# Patient Record
Sex: FEMALE | Race: BLACK OR AFRICAN AMERICAN | NOT HISPANIC OR LATINO | Employment: UNEMPLOYED | URBAN - METROPOLITAN AREA
[De-identification: names, ages, dates, MRNs, and addresses within clinical notes are randomized per-mention and may not be internally consistent; named-entity substitution may affect disease eponyms.]

---

## 2023-02-16 ENCOUNTER — INITIAL PRENATAL (OUTPATIENT)
Dept: OBSTETRICS AND GYNECOLOGY | Facility: CLINIC | Age: 18
End: 2023-02-16
Payer: COMMERCIAL

## 2023-02-16 VITALS — WEIGHT: 101.6 LBS

## 2023-02-16 DIAGNOSIS — O09.32 INITIAL OBSTETRIC VISIT IN SECOND TRIMESTER: Primary | ICD-10-CM

## 2023-02-16 LAB
B-HCG UR QL: POSITIVE
EXPIRATION DATE: ABNORMAL
INTERNAL NEGATIVE CONTROL: ABNORMAL
INTERNAL POSITIVE CONTROL: ABNORMAL
Lab: ABNORMAL

## 2023-02-16 PROCEDURE — 99204 OFFICE O/P NEW MOD 45 MIN: CPT | Performed by: OBSTETRICS & GYNECOLOGY

## 2023-02-16 PROCEDURE — 81025 URINE PREGNANCY TEST: CPT | Performed by: OBSTETRICS & GYNECOLOGY

## 2023-02-16 NOTE — PROGRESS NOTES
CC: Initial obstetric visit    HPI: 18-year-old  2 para 0 presents at approximately 22 weeks by an uncertain last menstrual period.  The patient reports that she had an ultrasound at an  clinic early in pregnancy that gave her due date of .  No other prenatal care to this point.  Overall, she is feeling well.  Had some nausea and vomiting in the first trimester, but none now.  Does feel fetal movement.  Obstetric history is significant for 1 induced  and this pregnancy.    Review of systems    This is negative for fever or chills.  Negative for nausea or vomiting.  Negative for for abdominal or pelvic pain.  All other systems are reviewed and are negative.    Assessment and plan    1.  Intrauterine pregnancy at an uncertain gestational age.  Approximately 20 weeks by physical examination.  Approximately 22 weeks by a first trimester ultrasound performed at an  clinic.  I recommend an ultrasound for screening and to confirm estimated gestational age.  The patient agrees.  2.  Pregnancy related counseling was undertaken and questions were answered.  3.  The patient reports that she already has prenatal vitamins.  4.  Prenatal labs today.  5.  1 hour glucose tolerance test at the next visit.  Counseled and questions answered.

## 2023-02-17 LAB
ABO GROUP BLD: ABNORMAL
BASOPHILS # BLD AUTO: 0 X10E3/UL (ref 0–0.2)
BASOPHILS NFR BLD AUTO: 1 %
BLD GP AB SCN SERPL QL: NEGATIVE
EOSINOPHIL # BLD AUTO: 0.1 X10E3/UL (ref 0–0.4)
EOSINOPHIL NFR BLD AUTO: 1 %
ERYTHROCYTE [DISTWIDTH] IN BLOOD BY AUTOMATED COUNT: 15.8 % (ref 11.7–15.4)
HBV SURFACE AG SERPL QL IA: NEGATIVE
HCT VFR BLD AUTO: 32 % (ref 34–46.6)
HCV IGG SERPL QL IA: NON REACTIVE
HGB BLD-MCNC: 10.5 G/DL (ref 11.1–15.9)
HGB S BLD QL SOLY: NEGATIVE
HIV 1+2 AB+HIV1 P24 AG SERPL QL IA: NON REACTIVE
IMM GRANULOCYTES # BLD AUTO: 0 X10E3/UL (ref 0–0.1)
IMM GRANULOCYTES NFR BLD AUTO: 0 %
LYMPHOCYTES # BLD AUTO: 1.7 X10E3/UL (ref 0.7–3.1)
LYMPHOCYTES NFR BLD AUTO: 23 %
MCH RBC QN AUTO: 27.3 PG (ref 26.6–33)
MCHC RBC AUTO-ENTMCNC: 32.8 G/DL (ref 31.5–35.7)
MCV RBC AUTO: 83 FL (ref 79–97)
MONOCYTES # BLD AUTO: 0.4 X10E3/UL (ref 0.1–0.9)
MONOCYTES NFR BLD AUTO: 6 %
NEUTROPHILS # BLD AUTO: 5 X10E3/UL (ref 1.4–7)
NEUTROPHILS NFR BLD AUTO: 69 %
PLATELET # BLD AUTO: 414 X10E3/UL (ref 150–450)
RBC # BLD AUTO: 3.85 X10E6/UL (ref 3.77–5.28)
RH BLD: POSITIVE
RPR SER QL: NON REACTIVE
RUBV IGG SERPL IA-ACNC: 1.66 INDEX
WBC # BLD AUTO: 7.2 X10E3/UL (ref 3.4–10.8)

## 2023-02-18 LAB
A VAGINAE DNA VAG QL NAA+PROBE: ABNORMAL SCORE
BVAB2 DNA VAG QL NAA+PROBE: ABNORMAL SCORE
C ALBICANS DNA VAG QL NAA+PROBE: NEGATIVE
C GLABRATA DNA VAG QL NAA+PROBE: NEGATIVE
C TRACH DNA VAG QL NAA+PROBE: NEGATIVE
MEGA1 DNA VAG QL NAA+PROBE: ABNORMAL SCORE
N GONORRHOEA DNA VAG QL NAA+PROBE: NEGATIVE
T VAGINALIS DNA VAG QL NAA+PROBE: NEGATIVE

## 2023-02-19 LAB
BACTERIA UR CULT: NORMAL
BACTERIA UR CULT: NORMAL

## 2023-03-17 ENCOUNTER — TELEPHONE (OUTPATIENT)
Dept: OBSTETRICS AND GYNECOLOGY | Facility: CLINIC | Age: 18
End: 2023-03-17

## 2023-03-17 ENCOUNTER — REFERRAL TRIAGE (OUTPATIENT)
Dept: LABOR AND DELIVERY | Facility: HOSPITAL | Age: 18
End: 2023-03-17
Payer: COMMERCIAL

## 2023-03-17 ENCOUNTER — ROUTINE PRENATAL (OUTPATIENT)
Dept: OBSTETRICS AND GYNECOLOGY | Facility: CLINIC | Age: 18
End: 2023-03-17
Payer: COMMERCIAL

## 2023-03-17 VITALS — WEIGHT: 107.6 LBS | SYSTOLIC BLOOD PRESSURE: 112 MMHG | DIASTOLIC BLOOD PRESSURE: 66 MMHG

## 2023-03-17 DIAGNOSIS — O09.32 INITIAL OBSTETRIC VISIT IN SECOND TRIMESTER: ICD-10-CM

## 2023-03-17 DIAGNOSIS — O26.879 SHORT CERVIX AFFECTING PREGNANCY: Primary | ICD-10-CM

## 2023-03-17 DIAGNOSIS — O09.30 LATE PRENATAL CARE: ICD-10-CM

## 2023-03-17 DIAGNOSIS — Z3A.24 24 WEEKS GESTATION OF PREGNANCY: Primary | ICD-10-CM

## 2023-03-17 DIAGNOSIS — O26.879 SHORT CERVIX AFFECTING PREGNANCY: ICD-10-CM

## 2023-03-17 LAB
GLUCOSE UR STRIP-MCNC: NEGATIVE MG/DL
PROT UR STRIP-MCNC: NEGATIVE MG/DL

## 2023-03-17 PROCEDURE — 99214 OFFICE O/P EST MOD 30 MIN: CPT | Performed by: NURSE PRACTITIONER

## 2023-03-17 NOTE — PROGRESS NOTES
Chief Complaint   Patient presents with   • Routine Prenatal Visit     OB f/u + 1 hr GTT + US     OB follow up     Venice Lao is a 18 y.o.  Unknown being seen today for her obstetrical visit.  Patient reports no complaints. Fetal movement: normal. Completing 1 hr GTT today. Anatomy scan today.    Review of Systems  Cramping/contractions: denies  Vaginal bleeding: denies  Fetal movement: normal    /66   Wt 48.8 kg (107 lb 9.6 oz)     Assessment/Plan    Diagnoses and all orders for this visit:    1. 24 weeks gestation of pregnancy (Primary)    2. Late prenatal care    3. Short cervix affecting pregnancy       Normal appearing anatomy, EFW 1lb 8oz, MVP 7.26cm, Cervical length 2.64cm, Possible cervical funneling vs cervical fluid, Fundal placenta with 3 vessel cord  Discussed possible shortened cervix vs fluid in the cervix. Encouraged no IC until further evaluation. Will have pt f/u with MFM as a precaution. Orders entered and I have reached out to MFM office  Reviewed fetal kick counts  Completing one hour GTT today  Tdap at next visit  Reviewed this stage of pregnancy  Problem list updated     Follow up in 2 week(s) with Dr. Pruitt    I spent 32 minutes caring for Venice on this date of service. This time includes time spent by me in the following activities: preparing for the visit, reviewing tests, obtaining and/or reviewing a separately obtained history, performing a medically appropriate examination and/or evaluation, counseling and educating the patient/family/caregiver, ordering medications, tests, or procedures, referring and communicating with other health care professionals and documenting information in the medical record    Adela Malave, APRN  3/17/2023  12:50 EDT

## 2023-03-18 LAB
BLD GP AB SCN SERPL QL: NEGATIVE
GLUCOSE 1H P 50 G GLC PO SERPL-MCNC: 110 MG/DL (ref 65–139)

## 2023-03-20 ENCOUNTER — PATIENT OUTREACH (OUTPATIENT)
Dept: LABOR AND DELIVERY | Facility: HOSPITAL | Age: 18
End: 2023-03-20
Payer: COMMERCIAL

## 2023-03-20 NOTE — OUTREACH NOTE
[facility]  Unable to Reach       Questions/Answers    Flowsheet Row Responses   Pending Outreach Confirm Patient Interest   Call Attempt First   Outcome Left message   Next Call Attempt Date 03/21/23          Called mobile number listed in [email or web address], person who answered gave me [phone]. Left vm to reach out with any needs. Will try again later this week.    Dwain Daugherty RN  Maternity Nurse Navigator    3/20/2023, 13:55 EDT

## 2023-03-21 ENCOUNTER — PATIENT OUTREACH (OUTPATIENT)
Dept: LABOR AND DELIVERY | Facility: HOSPITAL | Age: 18
End: 2023-03-21
Payer: COMMERCIAL

## 2023-03-21 NOTE — OUTREACH NOTE
[facility]  Unable to Reach       Questions/Answers    Flowsheet Row Responses   Pending Outreach Confirm Patient Interest   Call Attempt Second   Outcome Left message          Declined program for pt d/t UTR: 2 voicemails and 1 mychart msg sent. Pt has my contact info and encouraged her to reach out with any needs.      Dwain Daugherty RN  Maternity Nurse Navigator    3/21/2023, 11:13 EDT

## 2023-03-21 NOTE — PROGRESS NOTES
MATERNAL FETAL MEDICINE Consult Note    Dear Dr Adela Malave, APRN:    Thank you for your kind referral of Venice Lao.  As you know, she is a 18 y.o.   at 26  6/7 weeks gestation (Estimated Date of Delivery: 23). This is a consult.     Her antepartum course is complicated by:  Possible short cervix not seen today  FGR mostly normal dopplers    Aneuploidy Screening: none, collected today    HPI: Today, she denies headache, blurry vision, RUQ pain. No vaginal bleeding, no contractions.     Review of History:  History reviewed. No pertinent past medical history.  History reviewed. No pertinent surgical history.    Social History     Socioeconomic History   • Marital status: Single   Tobacco Use   • Smoking status: Never     Passive exposure: Never   • Smokeless tobacco: Never   Vaping Use   • Vaping Use: Never used   Substance and Sexual Activity   • Alcohol use: Never   • Drug use: Never   • Sexual activity: Yes     Partners: Male     Birth control/protection: None     History reviewed. No pertinent family history.   No Known Allergies   Current Outpatient Medications on File Prior to Visit   Medication Sig Dispense Refill   • prenatal vitamin (prenatal, CLASSIC, vitamin) tablet Take  by mouth Daily.       No current facility-administered medications on file prior to visit.        Past obstetric, gynecological, medical, surgical, family and social history reviewed.  Relevant lab work and imaging reviewed.    Review of systems  Constitutional:  denies fever, chills, malaise.   ENT/Mouth:  denies sore throat, tinnitis  Eyes: denies vision changes/pain  CV:  denies chest pain  Respiratory:  denies cough/SOB  GI:  denies N/V, diarrhea, abdominal pain.    :   denies dysuria  Skin:  denies lesions or pruritis   Neuro:  denies weakness, focal neurologic symptoms    Vitals:    23 1209   BP: 122/69   BP Location: Right arm   Patient Position: Sitting   Pulse: 82   Resp: 16   Temp: 98.2 °F (36.8 °C)  "  TempSrc: Temporal   Weight: 49.2 kg (108 lb 6.4 oz)   Height: 157.5 cm (62\")       PHYSICAL EXAM   GENERAL: Not in acute distress, AAOx3, pleasant  CARDIO: regular rate and rhythm  PULM: symmetric chest rise, speaking in complete sentences without difficulty  NEURO: awake, alert and oriented to person, place, and time  ABDOMINAL: No fundal tenderness, no rebound or guarding, gravid  EXTREMITIES: no bilateral lower extremity edema/tenderness  SKIN: Warm, well-perfused      ULTRASOUND   Please view full ultrasound note on Imaging tab in ViewPoint.  Breech presentation.  Fundal/right lateral placenta.  GLORIA 18 cm, which is normal.   g (22%, AC 7%)  BPP 8/8.  UA dopplers mostly normal (/10 elevated).  No absent or reversed flow noted.      ASSESSMENT/COUNSELIN y.o.   at 26  6/7 weeks gestation (Estimated Date of Delivery: 23).    -Pregnancy  [ X ] stable  [   ] improving [  ] worsening    Diagnoses and all orders for this visit:    1. Fetal growth problem suspected but not found (Primary)    2. Maternal care for poor fetal growth in third trimester, single or unspecified fetus  -     US [facility]; Standing         Short cervix: not found.  Thankfully, appears to be cervical mucous in canal and no funneling visualized.  Discussed with pt sometimes it is difficult to tell without multiple imaging.      FGR mostly normal dopplers  We discussed the potential underlying etiologies of severe fetal growth restriction including but not limited to fetal chromosome disorder, syndromes, congenital infection, constitutional, and placental insufficiency.  FGR is assoicated with increased risk of stillbirth, abnormal heart rates patterns,  delivery, oligohydramnios.  She has not had genetic screening but did have it done today.  I did  her that I can never rule out genetic causes by ultrasound or cell free DNA.  Cell free DNA does not check for all genetic issues.  " With congenital infections, we often see profound CNS findings such as ventriculomegaly, echogenic bowel, and even hydrops.  We do not see any of this today.       I think the most likely cause of this is placental insufficiency. Timing of delivery is guided by the results  testing of fetal well being (ultrasound BPP, FHR tracing findings) in addition to Doppler findings (umbilical artery, MCA, and ductus venosus).  Today we have mostly normal umbilical artery dopplers with an 8/8 BPP which is reassuring.  We discussed umbilical artery doppler studies as 4 possible levels: normal, elevated, absent, and reversed.  We discussed that absent and reversed would require admission,  corticosteroids, and possibly delivery depending on gestational age and other testing.         I would recommend limitation of activities, daily fetal movement assessment, and twice weekly ANFS with BPP and Doppler studies.  A worsening of findings on testing would indicate an increased risk of indicated delivery, and inpatient management may be recommended at that time(with steroids).       Finally, we discussed that placental insufficiency causing fetal growth restriction can be a heralding sign of pre-eclampsia.She has no evidence of this today, but should be observed for possible development as pregnancy progresses.  We discussed in detail symptoms of pre-eclampsia.      She will get cell free DNA today     Summary of Plan  -Repeat Growth in 2 weeks  -Monday [facility] with dopplers/ Thursday at primary OB for BPP or NST--will call OB office an dmake sure this works--she needs twice weekly testing.    -Getting panorama today  -Careful attention to fetal movement and symptoms of pre-eclampsia  -Continue to follow with OB for prenatal care     Follow-up:  at [facility]    Thank you for the consult and opportunity to care for this patient.  Please feel free to reach out with any questions or concerns.      I spent 25 minutes caring for this  patient on this date of service. This time includes time spent by me in the following activities: preparing for the visit, reviewing tests, obtaining and/or reviewing a separately obtained history, performing a medically appropriate examination and/or evaluation, counseling and educating the patient/family/caregiver and independently interpreting results and communicating that information with the patient/family/caregiver with greater than 50% spent in counseling and coordination of care.       I spent 5 minutes on the separately reported service of US imaging not included in the time used to support the E/M service also reported today.      Adela Godinez MD FACOG  Maternal Fetal Medicine-[facility]  Office: [phone]  [email or web address]

## 2023-03-22 ENCOUNTER — OFFICE VISIT (OUTPATIENT)
Dept: OBSTETRICS AND GYNECOLOGY | Facility: CLINIC | Age: 18
End: 2023-03-22
Payer: COMMERCIAL

## 2023-03-22 ENCOUNTER — HOSPITAL ENCOUNTER (OUTPATIENT)
Dept: ULTRASOUND IMAGING | Facility: HOSPITAL | Age: 18
Discharge: HOME | End: 2023-03-22
Payer: COMMERCIAL

## 2023-03-22 ENCOUNTER — LAB (OUTPATIENT)
Dept: LAB | Facility: HOSPITAL | Age: 18
End: 2023-03-22
Payer: COMMERCIAL

## 2023-03-22 VITALS
DIASTOLIC BLOOD PRESSURE: 69 MMHG | RESPIRATION RATE: 16 BRPM | WEIGHT: 108.4 LBS | BODY MASS INDEX: 19.95 KG/M2 | HEIGHT: 62 IN | SYSTOLIC BLOOD PRESSURE: 122 MMHG | TEMPERATURE: 98.2 F | HEART RATE: 82 BPM

## 2023-03-22 DIAGNOSIS — Z03.74 FETAL GROWTH PROBLEM SUSPECTED BUT NOT FOUND: Primary | ICD-10-CM

## 2023-03-22 DIAGNOSIS — O36.5930 MATERNAL CARE FOR POOR FETAL GROWTH IN THIRD TRIMESTER, SINGLE OR UNSPECIFIED FETUS: ICD-10-CM

## 2023-03-22 DIAGNOSIS — O26.879 SHORT CERVIX AFFECTING PREGNANCY: ICD-10-CM

## 2023-03-22 PROCEDURE — 76820 UMBILICAL ARTERY ECHO: CPT | Performed by: OBSTETRICS & GYNECOLOGY

## 2023-03-22 PROCEDURE — 76811 OB US DETAILED SNGL FETUS: CPT

## 2023-03-22 PROCEDURE — 76817 TRANSVAGINAL US OBSTETRIC: CPT

## 2023-03-22 PROCEDURE — 76819 FETAL BIOPHYS PROFIL W/O NST: CPT

## 2023-03-22 PROCEDURE — 76817 TRANSVAGINAL US OBSTETRIC: CPT | Performed by: OBSTETRICS & GYNECOLOGY

## 2023-03-22 PROCEDURE — 76820 UMBILICAL ARTERY ECHO: CPT

## 2023-03-22 PROCEDURE — 76811 OB US DETAILED SNGL FETUS: CPT | Performed by: OBSTETRICS & GYNECOLOGY

## 2023-03-22 PROCEDURE — 76819 FETAL BIOPHYS PROFIL W/O NST: CPT | Performed by: OBSTETRICS & GYNECOLOGY

## 2023-03-22 RX ORDER — PRENATAL VIT NO.126/IRON/FOLIC 28MG-0.8MG
TABLET ORAL DAILY
COMMUNITY

## 2023-03-22 NOTE — PROGRESS NOTES
Pt reports that she is doing well and denies vaginal bleeding, cramping, contractions or LOF at this time. Reports active fetal movement. Reviewed when to call OB office or present to L&D for evaluation with symptoms such as decreased fetal movement, vaginal bleeding, LOF or ctxs. Pt verbalized understanding. Denies HA, visual changes or epigastric pain. Denies any additional complaints at time of appointment. Next OB appointment scheduled for 03/30    Vitals:    03/22/23 1209   BP: 122/69   Pulse: 82   Resp: 16   Temp: 98.2 °F (36.8 °C)

## 2023-03-27 ENCOUNTER — OFFICE VISIT (OUTPATIENT)
Dept: OBSTETRICS AND GYNECOLOGY | Facility: CLINIC | Age: 18
End: 2023-03-27
Payer: COMMERCIAL

## 2023-03-27 ENCOUNTER — HOSPITAL ENCOUNTER (OUTPATIENT)
Dept: ULTRASOUND IMAGING | Facility: HOSPITAL | Age: 18
Discharge: HOME | End: 2023-03-27
Admitting: OBSTETRICS & GYNECOLOGY
Payer: COMMERCIAL

## 2023-03-27 VITALS
SYSTOLIC BLOOD PRESSURE: 111 MMHG | BODY MASS INDEX: 20.35 KG/M2 | RESPIRATION RATE: 16 BRPM | HEIGHT: 62 IN | TEMPERATURE: 98 F | HEART RATE: 94 BPM | DIASTOLIC BLOOD PRESSURE: 73 MMHG | WEIGHT: 110.6 LBS

## 2023-03-27 DIAGNOSIS — O36.5930 MATERNAL CARE FOR POOR FETAL GROWTH IN THIRD TRIMESTER, SINGLE OR UNSPECIFIED FETUS: Primary | ICD-10-CM

## 2023-03-27 PROCEDURE — 76819 FETAL BIOPHYS PROFIL W/O NST: CPT

## 2023-03-27 PROCEDURE — 76820 UMBILICAL ARTERY ECHO: CPT

## 2023-03-27 NOTE — PROGRESS NOTES
Pt reports that she is doing well and denies vaginal bleeding, cramping, contractions or LOF at this time. Reports active fetal movement. Reviewed when to call OB office or present to L&D for evaluation with symptoms such as decreased fetal movement, vaginal bleeding, LOF or ctxs. Pt verbalized understanding. Denies HA, visual changes or epigastric pain. Denies any additional complaints at time of appointment. Next OB appointment scheduled for 03/30    Vitals:    03/27/23 1212   BP: 111/73   Pulse: 94   Resp: 16   Temp: 98 °F (36.7 °C)

## 2023-03-27 NOTE — PROGRESS NOTES
MATERNAL FETAL MEDICINE Consult Note    Dear Dr Steven Pruitt MD:    Thank you for your kind referral of Venice Lao.  As you know, she is a 18 y.o.   at 26  6/7 weeks gestation (Estimated Date of Delivery: 23). This is a consult.     Her antepartum course is complicated by:  FGR mostly normal dopplers    Aneuploidy Screening: none, collected today    HPI: Today, she denies headache, blurry vision, RUQ pain. No vaginal bleeding, no contractions.     Review of History:  History reviewed. No pertinent past medical history.  History reviewed. No pertinent surgical history.    Social History     Socioeconomic History   • Marital status: Single   Tobacco Use   • Smoking status: Never     Passive exposure: Never   • Smokeless tobacco: Never   Vaping Use   • Vaping Use: Never used   Substance and Sexual Activity   • Alcohol use: Never   • Drug use: Never   • Sexual activity: Yes     Partners: Male     Birth control/protection: None     History reviewed. No pertinent family history.   No Known Allergies   Current Outpatient Medications on File Prior to Visit   Medication Sig Dispense Refill   • prenatal vitamin (prenatal, CLASSIC, vitamin) tablet Take  by mouth Daily.       No current facility-administered medications on file prior to visit.        Past obstetric, gynecological, medical, surgical, family and social history reviewed.  Relevant lab work and imaging reviewed.    Review of systems  Constitutional:  denies fever, chills, malaise.   ENT/Mouth:  denies sore throat, tinnitis  Eyes: denies vision changes/pain  CV:  denies chest pain  Respiratory:  denies cough/SOB  GI:  denies N/V, diarrhea, abdominal pain.    :   denies dysuria  Skin:  denies lesions or pruritis   Neuro:  denies weakness, focal neurologic symptoms    Vitals:    23 1212   BP: 111/73   BP Location: Right arm   Patient Position: Sitting   Pulse: 94   Resp: 16   Temp: 98 °F (36.7 °C)   TempSrc: Temporal   Weight: 50.2 kg  "(110 lb 9.6 oz)   Height: 157.5 cm (62\")       PHYSICAL EXAM   GENERAL: Not in acute distress, AAOx3, pleasant  CARDIO: regular rate and rhythm  PULM: symmetric chest rise, speaking in complete sentences without difficulty  NEURO: awake, alert and oriented to person, place, and time  ABDOMINAL: No fundal tenderness, no rebound or guarding, gravid  EXTREMITIES: no bilateral lower extremity edema/tenderness  SKIN: Warm, well-perfused      ULTRASOUND   Please view full ultrasound note on Imaging tab in ViewPoint.  Cephalic presentation.  Fundal/ right lateral placenta.  GLORIA 17.5 cm, which is normal.  BPP 8/8.  UA dopplers normal, with 1 isolated elevated doppler (1/8 elevated).  No waveforms with absent or reversed.      ASSESSMENT/COUNSELIN y.o.   at 26  6/7 weeks gestation (Estimated Date of Delivery: 23).    -Pregnancy  [ X ] stable  [   ] improving [  ] worsening    Diagnoses and all orders for this visit:    1. Maternal care for poor fetal growth in third trimester, single or unspecified fetus (Primary)         Short cervix: not found.  Thankfully, appears to be cervical mucous in canal and no funneling visualized.  Discussed with pt sometimes it is difficult to tell without multiple imaging.      FGR mostly normal dopplers  We reviewed the potential underlying etiologies of severe fetal growth restriction including but not limited to fetal chromosome disorder, syndromes, congenital infection, constitutional, and placental insufficiency. Cell free DNA pending and should be back in next 1-2 days and we will call her.       I think the most likely cause of this is placental insufficiency. Timing of delivery is guided by the results  testing of fetal well being (ultrasound BPP, FHR tracing findings) in addition to Doppler findings (umbilical artery, MCA, and ductus venosus).  Today we have mostly normal umbilical artery dopplers with an 8/8 BPP which is reassuring.  We discussed umbilical artery " doppler studies as 4 possible levels: normal, elevated, absent, and reversed.  We discussed that absent and reversed would require admission,  corticosteroids, and possibly delivery depending on gestational age and other testing.         I would recommend limitation of activities, daily fetal movement assessment, and twice weekly ANFS with BPP and Doppler studies.  A worsening of findings on testing would indicate an increased risk of indicated delivery, and inpatient management may be recommended at that time(with steroids).       Summary of Plan  -Repeat Growth in 1 weeks  - with dopplers/ Thursday at primary OB for BPP or NST  -Panorama pending and should be back soon  -Careful attention to fetal movement and symptoms of pre-eclampsia  -Continue to follow with OB for prenatal care     Follow-up:  at [facility]    Thank you for the consult and opportunity to care for this patient.  Please feel free to reach out with any questions or concerns.      I spent 15 minutes caring for this patient on this date of service. This time includes time spent by me in the following activities: preparing for the visit, reviewing tests, obtaining and/or reviewing a separately obtained history, performing a medically appropriate examination and/or evaluation, counseling and educating the patient/family/caregiver and independently interpreting results and communicating that information with the patient/family/caregiver with greater than 50% spent in counseling and coordination of care.       I spent 5 minutes on the separately reported service of US imaging not included in the time used to support the E/M service also reported today.      Adela Godinez MD FACOG  Maternal Fetal Medicine-[facility]  Office: [phone]  [email or web address]

## 2023-03-30 ENCOUNTER — ROUTINE PRENATAL (OUTPATIENT)
Dept: OBSTETRICS AND GYNECOLOGY | Facility: CLINIC | Age: 18
End: 2023-03-30
Payer: COMMERCIAL

## 2023-03-30 VITALS — BODY MASS INDEX: 20.38 KG/M2 | WEIGHT: 111.4 LBS | DIASTOLIC BLOOD PRESSURE: 62 MMHG | SYSTOLIC BLOOD PRESSURE: 110 MMHG

## 2023-03-30 DIAGNOSIS — O36.5939 SGA (SMALL FOR GESTATIONAL AGE), FETAL, AFFECTING CARE OF MOTHER, ANTEPARTUM, THIRD TRIMESTER, OTHER FETUS: ICD-10-CM

## 2023-03-30 DIAGNOSIS — Z3A.28 28 WEEKS GESTATION OF PREGNANCY: Primary | ICD-10-CM

## 2023-03-30 NOTE — PROGRESS NOTES
CC: Obstetric visit    HPI: 18-year-old  2 para 0 presents at 28-0/7 weeks for her obstetric visit.  Her baby is moving actively.  She has no complaints today.  She has been followed for SGA versus IUGR.  Biophysical profile with Dopplers was performed today.    Review of systems    This is negative for fever or chills.  Negative for nausea or vomiting.  Negative for abdominal or pelvic pain.    Physical examination    The abdomen is soft and gravid.  There is no epigastric tenderness.  No fundal tenderness.  No suprapubic tenderness.  Extremities are equal in size    Assessment and plan    1.  Intrauterine pregnancy at 28-0/7 weeks  2.  SGA versus IUGR.  Counseled and questions answered.  Biophysical profile today is 8 out of 8 with a normal amniotic fluid index and normal Dopplers.  The patient will continue having biophysical profiles in this office once weekly.  She will also see Dr. Godinez on .  Scan for growth is scheduled for Monday.  The patient understands that the baby may be constitutionally small versus growth restricted and that close follow-up is indicated.   You can access the FollowMyHealth Patient Portal offered by [facility] by registering at the following website: [email or web address]. By joining [employer]’s FollowMyHealth portal, you will also be able to view your health information using other applications (apps) compatible with our system.

## 2023-04-03 ENCOUNTER — HOSPITAL ENCOUNTER (OUTPATIENT)
Dept: ULTRASOUND IMAGING | Facility: HOSPITAL | Age: 18
Discharge: HOME | End: 2023-04-03
Admitting: OBSTETRICS & GYNECOLOGY
Payer: COMMERCIAL

## 2023-04-03 VITALS
HEIGHT: 62 IN | RESPIRATION RATE: 16 BRPM | WEIGHT: 111.8 LBS | TEMPERATURE: 97.8 F | HEART RATE: 71 BPM | BODY MASS INDEX: 20.57 KG/M2 | SYSTOLIC BLOOD PRESSURE: 118 MMHG | DIASTOLIC BLOOD PRESSURE: 69 MMHG

## 2023-04-03 PROCEDURE — 76820 UMBILICAL ARTERY ECHO: CPT

## 2023-04-03 PROCEDURE — 76819 FETAL BIOPHYS PROFIL W/O NST: CPT

## 2023-04-03 PROCEDURE — 76816 OB US FOLLOW-UP PER FETUS: CPT

## 2023-04-06 ENCOUNTER — ROUTINE PRENATAL (OUTPATIENT)
Dept: OBSTETRICS AND GYNECOLOGY | Facility: CLINIC | Age: 18
End: 2023-04-06
Payer: COMMERCIAL

## 2023-04-06 VITALS — SYSTOLIC BLOOD PRESSURE: 100 MMHG | BODY MASS INDEX: 19.97 KG/M2 | DIASTOLIC BLOOD PRESSURE: 62 MMHG | WEIGHT: 109.2 LBS

## 2023-04-06 DIAGNOSIS — O36.5939 SGA (SMALL FOR GESTATIONAL AGE), FETAL, AFFECTING CARE OF MOTHER, ANTEPARTUM, THIRD TRIMESTER, OTHER FETUS: ICD-10-CM

## 2023-04-06 DIAGNOSIS — Z3A.29 29 WEEKS GESTATION OF PREGNANCY: Primary | ICD-10-CM

## 2023-04-06 NOTE — PROGRESS NOTES
CC: Obstetric visit    HPI: 18-year-old  2 para 0 presents at 29-0/7 weeks for her obstetric visit.  Her baby is moving actively.  She has no complaints today.    Review of systems    This is negative for abdominal or pelvic pain.  Negative for vaginal bleeding.    Physical examination    The abdomen is soft and gravid.  There is no epigastric tenderness.  No fundal tenderness.  No suprapubic tenderness.  Extremities are equal in size    Assessment and plan    1.  Intrauterine pregnancy at 29-0/7 weeks  2.  The patient is followed for SGA versus IUGR.  Biophysical profile was performed today.  This was reviewed and discussed with the patient.  BPP is 8 out of 8 with a normal amniotic fluid index.  The patient has a scan for interval growth with maternal-fetal medicine on Monday.  Further recommendations pending the results of this study.

## 2023-04-09 NOTE — PROGRESS NOTES
MATERNAL FETAL MEDICINE Consult Note    Dear Dr Steven Pruitt MD:    Thank you for your kind referral of Venice Lao.  As you know, she is a 18 y.o.   at 29  4/7 weeks gestation (Estimated Date of Delivery: 23). This is a consult.     Her antepartum course is complicated by:  FGR normal dopplers    Aneuploidy Screening: low risk male    HPI: Today, she denies headache, blurry vision, RUQ pain. No vaginal bleeding, no contractions.     Review of History:  History reviewed. No pertinent past medical history.  History reviewed. No pertinent surgical history.    Social History     Socioeconomic History   • Marital status: Single   Tobacco Use   • Smoking status: Never     Passive exposure: Never   • Smokeless tobacco: Never   Vaping Use   • Vaping Use: Never used   Substance and Sexual Activity   • Alcohol use: Never   • Drug use: Never   • Sexual activity: Yes     Partners: Male     Birth control/protection: None     History reviewed. No pertinent family history.   No Known Allergies   Current Outpatient Medications on File Prior to Visit   Medication Sig Dispense Refill   • prenatal vitamin (prenatal, CLASSIC, vitamin) tablet Take  by mouth Daily.       No current facility-administered medications on file prior to visit.        Past obstetric, gynecological, medical, surgical, family and social history reviewed.  Relevant lab work and imaging reviewed.    Review of systems  Constitutional:  denies fever, chills, malaise.   ENT/Mouth:  denies sore throat, tinnitis  Eyes: denies vision changes/pain  CV:  denies chest pain  Respiratory:  denies cough/SOB  GI:  denies N/V, diarrhea, abdominal pain.    :   denies dysuria  Skin:  denies lesions or pruritis   Neuro:  denies weakness, focal neurologic symptoms    Vitals:    04/10/23 1211   BP: 119/64   BP Location: Right arm   Patient Position: Sitting   Pulse: 88   Resp: 16   Temp: 97.8 °F (36.6 °C)   TempSrc: Temporal   Weight: 51.8 kg (114 lb 3.2 oz)  "  Height: 157.5 cm (62\")       PHYSICAL EXAM   GENERAL: Not in acute distress, AAOx3, pleasant  CARDIO: regular rate and rhythm  PULM: symmetric chest rise, speaking in complete sentences without difficulty  NEURO: awake, alert and oriented to person, place, and time  ABDOMINAL: No fundal tenderness, no rebound or guarding, gravid  EXTREMITIES: no bilateral lower extremity edema/tenderness  SKIN: Warm, well-perfused      ULTRASOUND   Please view full ultrasound note on Imaging tab in ViewPoint.  Cephalic presentation.  Fundal right placenta.  GLORIA 19 cm, which is normal.  BPP 8/8.  Umbilical artery doppler studies normal without absent or reversed flow.     ASSESSMENT/COUNSELIN y.o.   at 29  4/7 weeks gestation (Estimated Date of Delivery: 23).    -Pregnancy  [ X ] stable  [   ] improving [  ] worsening    Diagnoses and all orders for this visit:    1. Maternal care for poor fetal growth in third trimester, single or unspecified fetus (Primary)         Short cervix: not found.  Thankfully, appears to be cervical mucous in canal and no funneling visualized.  Discussed with pt sometimes it is difficult to tell without multiple imaging.      FGR  normal dopplers  Normal cell free DNA     I think the most likely cause of this is placental insufficiency. Timing of delivery is guided by the results  testing of fetal well being (ultrasound BPP, FHR tracing findings) in addition to Doppler findings (umbilical artery, MCA, and ductus venosus).  Today we have mostly normal umbilical artery dopplers with an 8/8 BPP which is reassuring.  We discussed umbilical artery doppler studies as 4 possible levels: normal, elevated, absent, and reversed.  We discussed that absent and reversed would require admission,  corticosteroids, and possibly delivery depending on gestational age and other testing.         I would recommend limitation of activities, daily fetal movement assessment, and twice weekly ANFS " with BPP and Doppler studies.  A worsening of findings on testing would indicate an increased risk of indicated delivery, and inpatient management may be recommended at that time(with steroids).       Summary of Plan  -Monday [facility] with dopplers/ Thursday at primary OB for BPP or NST  -Careful attention to fetal movement and symptoms of pre-eclampsia  -Continue to follow with OB for prenatal care     Follow-up: Mondays at [facility] for BPP/dopplers    Thank you for the consult and opportunity to care for this patient.  Please feel free to reach out with any questions or concerns.      I spent 20 minutes caring for this patient on this date of service. This time includes time spent by me in the following activities: preparing for the visit, reviewing tests, obtaining and/or reviewing a separately obtained history, performing a medically appropriate examination and/or evaluation, counseling and educating the patient/family/caregiver and independently interpreting results and communicating that information with the patient/family/caregiver with greater than 50% spent in counseling and coordination of care.       I spent 4 minutes on the separately reported service of US imaging not included in the time used to support the E/M service also reported today.      Adela Godinez MD FACOG  Maternal Fetal Medicine-[facility]  Office: [phone]  [email or web address]

## 2023-04-10 ENCOUNTER — OFFICE VISIT (OUTPATIENT)
Dept: OBSTETRICS AND GYNECOLOGY | Facility: CLINIC | Age: 18
End: 2023-04-10
Payer: COMMERCIAL

## 2023-04-10 ENCOUNTER — HOSPITAL ENCOUNTER (OUTPATIENT)
Dept: ULTRASOUND IMAGING | Facility: HOSPITAL | Age: 18
Discharge: HOME | End: 2023-04-10
Admitting: OBSTETRICS & GYNECOLOGY
Payer: COMMERCIAL

## 2023-04-10 VITALS
HEART RATE: 88 BPM | DIASTOLIC BLOOD PRESSURE: 64 MMHG | RESPIRATION RATE: 16 BRPM | HEIGHT: 62 IN | TEMPERATURE: 97.8 F | SYSTOLIC BLOOD PRESSURE: 119 MMHG | BODY MASS INDEX: 21.02 KG/M2 | WEIGHT: 114.2 LBS

## 2023-04-10 DIAGNOSIS — O36.5930 MATERNAL CARE FOR POOR FETAL GROWTH IN THIRD TRIMESTER, SINGLE OR UNSPECIFIED FETUS: Primary | ICD-10-CM

## 2023-04-10 PROCEDURE — 76819 FETAL BIOPHYS PROFIL W/O NST: CPT

## 2023-04-10 PROCEDURE — 76820 UMBILICAL ARTERY ECHO: CPT

## 2023-04-10 NOTE — PROGRESS NOTES
Pt reports that she is doing well and denies vaginal bleeding, cramping, contractions or LOF at this time. Reports active fetal movement Denies HA, visual changes or epigastric pain. Denies any additional complaints at time of appointment. Next OB appointment scheduled for 04/13    Vitals:    04/10/23 1211   BP: 119/64   Pulse: 88   Resp: 16   Temp: 97.8 °F (36.6 °C)

## 2023-04-10 NOTE — LETTER
April 10, 2023     Steven Pruitt MD  [address] [place] [address]  [address]  [facility] [address]    Patient: Venice Lao   YOB: 2005   Date of Visit: 4/10/2023       Dear Steven Pruitt MD,    Thank you for referring Venice Lao to me for evaluation. Below is a copy of my consult note.    If you have questions, please do not hesitate to call me. I look forward to following Venice along with you.         Sincerely,        Adela Godinez MD        MATERNAL FETAL MEDICINE Consult Note    Dear Dr Steven Pruitt MD:    Thank you for your kind referral of Venice Lao.  As you know, she is a 18 y.o.   at 29  4/7 weeks gestation (Estimated Date of Delivery: 23). This is a consult.     Her antepartum course is complicated by:  FGR normal dopplers    Aneuploidy Screening: low risk male    HPI: Today, she denies headache, blurry vision, RUQ pain. No vaginal bleeding, no contractions.     Review of History:  History reviewed. No pertinent past medical history.  History reviewed. No pertinent surgical history.    Social History     Socioeconomic History   • Marital status: Single   Tobacco Use   • Smoking status: Never     Passive exposure: Never   • Smokeless tobacco: Never   Vaping Use   • Vaping Use: Never used   Substance and Sexual Activity   • Alcohol use: Never   • Drug use: Never   • Sexual activity: Yes     Partners: Male     Birth control/protection: None     History reviewed. No pertinent family history.   No Known Allergies   Current Outpatient Medications on File Prior to Visit   Medication Sig Dispense Refill   • prenatal vitamin (prenatal, CLASSIC, vitamin) tablet Take  by mouth Daily.       No current facility-administered medications on file prior to visit.        Past obstetric, gynecological, medical, surgical, family and social history reviewed.  Relevant lab work and imaging reviewed.    Review of systems  Constitutional:  denies fever, chills, malaise.  "  ENT/Mouth:  denies sore throat, tinnitis  Eyes: denies vision changes/pain  CV:  denies chest pain  Respiratory:  denies cough/SOB  GI:  denies N/V, diarrhea, abdominal pain.    :   denies dysuria  Skin:  denies lesions or pruritis   Neuro:  denies weakness, focal neurologic symptoms    Vitals:    04/10/23 1211   BP: 119/64   BP Location: Right arm   Patient Position: Sitting   Pulse: 88   Resp: 16   Temp: 97.8 °F (36.6 °C)   TempSrc: Temporal   Weight: 51.8 kg (114 lb 3.2 oz)   Height: 157.5 cm (62\")       PHYSICAL EXAM   GENERAL: Not in acute distress, AAOx3, pleasant  CARDIO: regular rate and rhythm  PULM: symmetric chest rise, speaking in complete sentences without difficulty  NEURO: awake, alert and oriented to person, place, and time  ABDOMINAL: No fundal tenderness, no rebound or guarding, gravid  EXTREMITIES: no bilateral lower extremity edema/tenderness  SKIN: Warm, well-perfused      ULTRASOUND   Please view full ultrasound note on Imaging tab in [facility].  Cephalic presentation.  Fundal right placenta.  GLORIA 19 cm, which is normal.  BPP 8/8.  Umbilical artery doppler studies normal without absent or reversed flow.     ASSESSMENT/COUNSELIN y.o.   at 29  4/7 weeks gestation (Estimated Date of Delivery: 23).    -Pregnancy  [ X ] stable  [   ] improving [  ] worsening    Diagnoses and all orders for this visit:    1. Maternal care for poor fetal growth in third trimester, single or unspecified fetus (Primary)         Short cervix: not found.  Thankfully, appears to be cervical mucous in canal and no funneling visualized.  Discussed with pt sometimes it is difficult to tell without multiple imaging.      FGR  normal dopplers  Normal cell free DNA     I think the most likely cause of this is placental insufficiency. Timing of delivery is guided by the results  testing of fetal well being (ultrasound BPP, FHR tracing findings) in addition to Doppler findings (umbilical artery, MCA, and " ductus venosus).  Today we have mostly normal umbilical artery dopplers with an 8/8 BPP which is reassuring.  We discussed umbilical artery doppler studies as 4 possible levels: normal, elevated, absent, and reversed.  We discussed that absent and reversed would require admission,  corticosteroids, and possibly delivery depending on gestational age and other testing.         I would recommend limitation of activities, daily fetal movement assessment, and twice weekly ANFS with BPP and Doppler studies.  A worsening of findings on testing would indicate an increased risk of indicated delivery, and inpatient management may be recommended at that time(with steroids).       Summary of Plan  -Monday [facility] with dopplers/ Thursday at primary OB for BPP or NST  -Careful attention to fetal movement and symptoms of pre-eclampsia  -Continue to follow with OB for prenatal care     Follow-up:  at [facility] for BPP/dopplers    Thank you for the consult and opportunity to care for this patient.  Please feel free to reach out with any questions or concerns.      I spent 20 minutes caring for this patient on this date of service. This time includes time spent by me in the following activities: preparing for the visit, reviewing tests, obtaining and/or reviewing a separately obtained history, performing a medically appropriate examination and/or evaluation, counseling and educating the patient/family/caregiver and independently interpreting results and communicating that information with the patient/family/caregiver with greater than 50% spent in counseling and coordination of care.       I spent 4 minutes on the separately reported service of US imaging not included in the time used to support the E/M service also reported today.      Adela Godinez MD [facility]  Maternal Fetal Medicine-[facility]  Office: [phone]  [email or web address]         20

## 2023-04-13 ENCOUNTER — ROUTINE PRENATAL (OUTPATIENT)
Dept: OBSTETRICS AND GYNECOLOGY | Facility: CLINIC | Age: 18
End: 2023-04-13
Payer: COMMERCIAL

## 2023-04-13 VITALS — WEIGHT: 115.2 LBS | SYSTOLIC BLOOD PRESSURE: 114 MMHG | BODY MASS INDEX: 21.07 KG/M2 | DIASTOLIC BLOOD PRESSURE: 62 MMHG

## 2023-04-13 DIAGNOSIS — Z3A.30 30 WEEKS GESTATION OF PREGNANCY: Primary | ICD-10-CM

## 2023-04-13 DIAGNOSIS — O36.5931 IUGR (INTRAUTERINE GROWTH RESTRICTION) AFFECTING CARE OF MOTHER, THIRD TRIMESTER, FETUS 1: ICD-10-CM

## 2023-04-13 LAB
GLUCOSE UR STRIP-MCNC: NEGATIVE MG/DL
PROT UR STRIP-MCNC: NEGATIVE MG/DL

## 2023-04-13 NOTE — PROGRESS NOTES
C: Obstetric visit    HPI: 18-year-old  2 para 0 presents at 30-0/7 weeks for her obstetric visit.  She has been followed in this office as well as by maternal-fetal medicine due to intrauterine growth restriction.  Biophysical profile at maternal-fetal medicine and again today returned 8 out of 8 with a normal amniotic fluid index.  The patient is feeling well.  She does not feel contractions or pelvic pressure.    Review of systems    This is negative for fever or chills.  Negative for nausea or vomiting.  Negative for pelvic pressure or pain.    Physical examination    The abdomen is soft and gravid.  There is no epigastric tenderness.  No fundal tenderness.  No suprapubic tenderness.  Extremities are equal in size  Assessment and plan    1.  Intrauterine pregnancy at 30-0/7 weeks  2.  Intrauterine growth restriction.  Biophysical profile here and with maternal-fetal medicine has been 8 out of 8 with normal amniotic fluid indices.  Continue twice-weekly testing.

## 2023-04-16 NOTE — PROGRESS NOTES
MATERNAL FETAL MEDICINE Consult Note    Dear Dr Afshin Mart MD:    Thank you for your kind referral of Venice Lao.  As you know, she is a 18 y.o.   at 30  4/7 weeks gestation (Estimated Date of Delivery: 23). This is a consult.     Her antepartum course is complicated by:  FGR, now intermittently elevated dopplers    Aneuploidy Screening: low risk male    HPI: Today, she denies headache, blurry vision, RUQ pain. No vaginal bleeding, no contractions.     Review of History:  History reviewed. No pertinent past medical history.  History reviewed. No pertinent surgical history.    Social History     Socioeconomic History   • Marital status: Single   Tobacco Use   • Smoking status: Never     Passive exposure: Never   • Smokeless tobacco: Never   Vaping Use   • Vaping Use: Never used   Substance and Sexual Activity   • Alcohol use: Never   • Drug use: Never   • Sexual activity: Yes     Partners: Male     Birth control/protection: None     History reviewed. No pertinent family history.   No Known Allergies   Current Outpatient Medications on File Prior to Visit   Medication Sig Dispense Refill   • prenatal vitamin (prenatal, CLASSIC, vitamin) tablet Take  by mouth Daily.       No current facility-administered medications on file prior to visit.        Past obstetric, gynecological, medical, surgical, family and social history reviewed.  Relevant lab work and imaging reviewed.    Review of systems  Constitutional:  denies fever, chills, malaise.   ENT/Mouth:  denies sore throat, tinnitis  Eyes: denies vision changes/pain  CV:  denies chest pain  Respiratory:  denies cough/SOB  GI:  denies N/V, diarrhea, abdominal pain.    :   denies dysuria  Skin:  denies lesions or pruritis   Neuro:  denies weakness, focal neurologic symptoms    Vitals:    23 1340   BP: 112/64   BP Location: Right arm   Patient Position: Sitting   Pulse: 66   Resp: 16   Temp: 98.4 °F (36.9 °C)   TempSrc: Temporal  "  Weight: 52.5 kg (115 lb 11.9 oz)   Height: 157.5 cm (62\")       PHYSICAL EXAM   GENERAL: Not in acute distress, AAOx3, pleasant  CARDIO: regular rate and rhythm  PULM: symmetric chest rise, speaking in complete sentences without difficulty  NEURO: awake, alert and oriented to person, place, and time  ABDOMINAL: No fundal tenderness, no rebound or guarding, gravid  EXTREMITIES: no bilateral lower extremity edema/tenderness  SKIN: Warm, well-perfused      ULTRASOUND   Please view full ultrasound note on Imaging tab in [facility]Point.  Cephalic presentation.  Fundal placenta.   GLORIA 24 cm, which is high normal.   BPP 8/8  UA dopplers intermittently elevated ( elevated) without absent or reversed flow.    ASSESSMENT/COUNSELIN y.o.   at 30  4/7 weeks gestation (Estimated Date of Delivery: 23).    -Pregnancy  [ X ] stable  [   ] improving [  ] worsening    Diagnoses and all orders for this visit:    1. IUGR (intrauterine growth restriction) affecting care of mother, third trimester, fetus 1 (Primary)         FGR, now elevated dopplers  Normal cell free DNA     I think the most likely cause of this is placental insufficiency. Timing of delivery is guided by the results  testing of fetal well being (ultrasound BPP, FHR tracing findings) in addition to Doppler findings (umbilical artery, MCA, and ductus venosus).  Today we have elevated dopplers intermittently, but a BPP of 8/8.  We discussed that absent and reversed would require admission,  corticosteroids, and possibly delivery depending on gestational age and other testing.         I would recommend limitation of activities, daily fetal movement assessment, and twice weekly ANFS with BPP and Doppler studies.  A worsening of findings on testing would indicate an increased risk of indicated delivery, and inpatient management may be recommended at that time(with steroids).       Summary of Plan  -Monday [facility] with dopplers/ Thursday at primary OB " for BPP or NST  -Careful attention to fetal movement and symptoms of pre-eclampsia  -Continue to follow with OB for prenatal care     Follow-up: Mondays at [facility] for BPP/dopplers    Thank you for the consult and opportunity to care for this patient.  Please feel free to reach out with any questions or concerns.      I spent 15 minutes caring for this patient on this date of service. This time includes time spent by me in the following activities: preparing for the visit, reviewing tests, obtaining and/or reviewing a separately obtained history, performing a medically appropriate examination and/or evaluation, counseling and educating the patient/family/caregiver and independently interpreting results and communicating that information with the patient/family/caregiver with greater than 50% spent in counseling and coordination of care.       I spent 4 minutes on the separately reported service of US imaging not included in the time used to support the E/M service also reported today.      Adela Godinez MD FACOG  Maternal Fetal Medicine-[facility]  Office: [phone]  [email or web address]

## 2023-04-17 ENCOUNTER — OFFICE VISIT (OUTPATIENT)
Dept: OBSTETRICS AND GYNECOLOGY | Facility: CLINIC | Age: 18
End: 2023-04-17
Payer: COMMERCIAL

## 2023-04-17 ENCOUNTER — HOSPITAL ENCOUNTER (OUTPATIENT)
Dept: ULTRASOUND IMAGING | Facility: HOSPITAL | Age: 18
Discharge: HOME | End: 2023-04-17
Admitting: OBSTETRICS & GYNECOLOGY
Payer: COMMERCIAL

## 2023-04-17 VITALS
RESPIRATION RATE: 16 BRPM | HEART RATE: 66 BPM | DIASTOLIC BLOOD PRESSURE: 64 MMHG | BODY MASS INDEX: 21.3 KG/M2 | WEIGHT: 115.74 LBS | HEIGHT: 62 IN | SYSTOLIC BLOOD PRESSURE: 112 MMHG | TEMPERATURE: 98.4 F

## 2023-04-17 DIAGNOSIS — O36.5931 IUGR (INTRAUTERINE GROWTH RESTRICTION) AFFECTING CARE OF MOTHER, THIRD TRIMESTER, FETUS 1: Primary | ICD-10-CM

## 2023-04-17 PROCEDURE — 76819 FETAL BIOPHYS PROFIL W/O NST: CPT

## 2023-04-17 PROCEDURE — 76820 UMBILICAL ARTERY ECHO: CPT

## 2023-04-17 NOTE — PROGRESS NOTES
Pt reports that she is doing well and denies vaginal bleeding, cramping, contractions or LOF at this time. Reports active fetal movement. Reviewed when to call OB office or present to L&D for evaluation with symptoms such as decreased fetal movement, vaginal bleeding, LOF or ctxs. Pt verbalized understanding. Denies HA, visual changes or epigastric pain. Denies any additional complaints at time of appointment. Next OB appointment scheduled for 04/21    Vitals:    04/17/23 1340   BP: 112/64   Pulse: 66   Resp: 16   Temp: 98.4 °F (36.9 °C)

## 2023-04-21 ENCOUNTER — ROUTINE PRENATAL (OUTPATIENT)
Dept: OBSTETRICS AND GYNECOLOGY | Facility: CLINIC | Age: 18
End: 2023-04-21
Payer: COMMERCIAL

## 2023-04-21 VITALS — BODY MASS INDEX: 21.22 KG/M2 | DIASTOLIC BLOOD PRESSURE: 62 MMHG | WEIGHT: 116 LBS | SYSTOLIC BLOOD PRESSURE: 117 MMHG

## 2023-04-21 DIAGNOSIS — O36.5931 IUGR (INTRAUTERINE GROWTH RESTRICTION) AFFECTING CARE OF MOTHER, THIRD TRIMESTER, FETUS 1: ICD-10-CM

## 2023-04-21 DIAGNOSIS — Z3A.31 31 WEEKS GESTATION OF PREGNANCY: Primary | ICD-10-CM

## 2023-04-21 NOTE — PROGRESS NOTES
CC: Obstetric visit    HPI: 18-year-old  2 para 0 presents at 31-1/7 weeks for her obstetric visit.  The patient has been followed in our office and by maternal-fetal medicine for intrauterine growth restriction.  The baby is moving actively.  [facility] visit for Monday was reviewed and discussed with the patient.  Findings were reassuring.  Biophysical profile was also performed today.    Review of systems    This is negative for fever or chills.  Negative for nausea or vomiting.  Negative for abdominal or pelvic pain.    Physical examination    The abdomen is soft and gravid.  There is no epigastric tenderness.  No fundal tenderness.  No suprapubic tenderness.  Extremities are equal in size    Assessment and plan    1.  Intrauterine pregnancy at 31-1/7 weeks  2.  Intrauterine growth restriction.  Counseled and questions answered.  Biophysical profile was reviewed and discussed with the patient.  This shows an 8 out of 8 BPP with normal amniotic fluid index and no reversed or absent end-diastolic flow.  We will continue twice weekly visits, once with maternal-fetal medicine and once with me.  The patient agrees with these recommendations.

## 2023-04-23 NOTE — PROGRESS NOTES
MATERNAL FETAL MEDICINE Consult Note    Dear Dr Steven Pruitt MD:    Thank you for your kind referral of Venice Lao.  As you know, she is a 18 y.o.   at 31  4/7 weeks gestation (Estimated Date of Delivery: 23). This is a consult.     Her antepartum course is complicated by:  FGR, now intermittently elevated dopplers    Aneuploidy Screening: low risk male    HPI: Today, she denies headache, blurry vision, RUQ pain. No vaginal bleeding, no contractions.     Review of History:  History reviewed. No pertinent past medical history.  History reviewed. No pertinent surgical history.    Social History     Socioeconomic History   • Marital status: Single   Tobacco Use   • Smoking status: Never     Passive exposure: Never   • Smokeless tobacco: Never   Vaping Use   • Vaping Use: Never used   Substance and Sexual Activity   • Alcohol use: Never   • Drug use: Never   • Sexual activity: Yes     Partners: Male     Birth control/protection: None     History reviewed. No pertinent family history.   No Known Allergies   Current Outpatient Medications on File Prior to Visit   Medication Sig Dispense Refill   • prenatal vitamin (prenatal, CLASSIC, vitamin) tablet Take  by mouth Daily.       No current facility-administered medications on file prior to visit.        Past obstetric, gynecological, medical, surgical, family and social history reviewed.  Relevant lab work and imaging reviewed.    Review of systems  Constitutional:  denies fever, chills, malaise.   ENT/Mouth:  denies sore throat, tinnitis  Eyes: denies vision changes/pain  CV:  denies chest pain  Respiratory:  denies cough/SOB  GI:  denies N/V, diarrhea, abdominal pain.    :   denies dysuria  Skin:  denies lesions or pruritis   Neuro:  denies weakness, focal neurologic symptoms    Vitals:    23 1106   BP: 113/62   BP Location: Left arm   Patient Position: Sitting   Pulse: 75   Temp: 97.8 °F (36.6 °C)   TempSrc: Temporal   Weight: 54.1 kg (119  lb 3.2 oz)       PHYSICAL EXAM   GENERAL: Not in acute distress, AAOx3, pleasant  CARDIO: regular rate and rhythm  PULM: symmetric chest rise, speaking in complete sentences without difficulty  NEURO: awake, alert and oriented to person, place, and time  ABDOMINAL: No fundal tenderness, no rebound or guarding, gravid  EXTREMITIES: no bilateral lower extremity edema/tenderness  SKIN: Warm, well-perfused      ULTRASOUND   Please view full ultrasound note on Imaging tab in View[employer].  Cephalic presentation  Right fundal placenta.  GLORIA 20.8, which is normal.  BPP 6/8 (-2 for breathing), but 8/10 with reactive/reassuring NST.  UA dopplers intermittently elevated (2/10 elevated) with no absent or reversed flow.      NST:  Indication: BPP 6/8, FGR  Duration: 30 minutes  Findings: 120/moderate/+acc/-dec  [place] rare irritability  Category 1, reactive/reassuring      ASSESSMENT/COUNSELIN y.o.   at 31  4/7 weeks gestation (Estimated Date of Delivery: 23).    -Pregnancy  [ X ] stable  [   ] improving [  ] worsening    Diagnoses and all orders for this visit:    1. IUGR (intrauterine growth restriction) affecting care of mother, third trimester, fetus 1 (Primary)         FGR, stable intermittently elevated dopplers.    Normal cell free DNA     I think the most likely cause of this is placental insufficiency. Timing of delivery is guided by the results  testing of fetal well being (ultrasound BPP, FHR tracing findings) in addition to Doppler findings (umbilical artery, MCA, and ductus venosus).  Today we have elevated dopplers intermittently, but a BPP of 8/8.  We discussed that absent and reversed would require admission,  corticosteroids, and possibly delivery depending on gestational age and other testing.         I would recommend limitation of activities, daily fetal movement assessment, and twice weekly ANFS with BPP and Doppler studies.  A worsening of findings on testing would indicate an  increased risk of indicated delivery, and inpatient management may be recommended at that time(with steroids).      BPP 6/8 for breathing, but very active fetus and <32 weeks.  With NST, BPP reassuring at 8/10.  Discussed movement precautions and does BPP Thursday with Dr. Franks.       Summary of Plan  -Monday [facility] with dopplers/ Thursday at primary OB for BPP or NST  -Careful attention to fetal movement and symptoms of pre-eclampsia  -Continue to follow with OB for prenatal care     Follow-up: Mondays at [facility] for BPP/dopplers    Thank you for the consult and opportunity to care for this patient.  Please feel free to reach out with any questions or concerns.      I spent 20 minutes caring for this patient on this date of service. This time includes time spent by me in the following activities: preparing for the visit, reviewing tests, obtaining and/or reviewing a separately obtained history, performing a medically appropriate examination and/or evaluation, counseling and educating the patient/family/caregiver and independently interpreting results and communicating that information with the patient/family/caregiver with greater than 50% spent in counseling and coordination of care.       I spent 4 minutes on the separately reported service of US imaging not included in the time used to support the E/M service also reported today.      Adela Godinez MD FACOG  Maternal Fetal Medicine-[facility]  Office: [phone]  [email or web address]

## 2023-04-24 ENCOUNTER — HOSPITAL ENCOUNTER (OUTPATIENT)
Dept: ULTRASOUND IMAGING | Facility: HOSPITAL | Age: 18
Discharge: HOME | End: 2023-04-24
Admitting: OBSTETRICS & GYNECOLOGY
Payer: COMMERCIAL

## 2023-04-24 ENCOUNTER — OFFICE VISIT (OUTPATIENT)
Dept: OBSTETRICS AND GYNECOLOGY | Facility: CLINIC | Age: 18
End: 2023-04-24
Payer: COMMERCIAL

## 2023-04-24 VITALS
HEART RATE: 75 BPM | WEIGHT: 119.2 LBS | SYSTOLIC BLOOD PRESSURE: 113 MMHG | TEMPERATURE: 97.8 F | BODY MASS INDEX: 21.8 KG/M2 | DIASTOLIC BLOOD PRESSURE: 62 MMHG

## 2023-04-24 DIAGNOSIS — O36.5931 IUGR (INTRAUTERINE GROWTH RESTRICTION) AFFECTING CARE OF MOTHER, THIRD TRIMESTER, FETUS 1: Primary | ICD-10-CM

## 2023-04-24 PROCEDURE — 76819 FETAL BIOPHYS PROFIL W/O NST: CPT

## 2023-04-24 PROCEDURE — 99213 OFFICE O/P EST LOW 20 MIN: CPT | Performed by: OBSTETRICS & GYNECOLOGY

## 2023-04-24 PROCEDURE — 76820 UMBILICAL ARTERY ECHO: CPT | Performed by: OBSTETRICS & GYNECOLOGY

## 2023-04-24 PROCEDURE — 76820 UMBILICAL ARTERY ECHO: CPT

## 2023-04-24 PROCEDURE — 76819 FETAL BIOPHYS PROFIL W/O NST: CPT | Performed by: OBSTETRICS & GYNECOLOGY

## 2023-04-24 PROCEDURE — 59025 FETAL NON-STRESS TEST: CPT | Performed by: OBSTETRICS & GYNECOLOGY

## 2023-04-24 NOTE — PROGRESS NOTES
Pt reports that she is doing well and denies vaginal bleeding or LOF at this time. Reports active fetal movement. Notes irregular abdominal cramping, denies consistency. Reviewed when to call OB office or present to L&D for evaluation with symptoms such as decreased fetal movement, vaginal bleeding, LOF or ctxs. Pt verbalized understanding. Denies HA, visual changes or epigastric pain. Denies any additional complaints at time of appointment. Next OB appointment scheduled for 4/27.      Reason for test:  BPP 6/8 (-2 for breathing)  Date of Test: 4/24/2023  Time frame of test: 9926-7495  RN NST Interpretation:  Reactive NST. Moderate variability. Accelerations and no decelerations.       Vitals:    04/24/23 1106   BP: 113/62   Pulse: 75   Temp: 97.8 °F (36.6 °C)

## 2023-04-24 NOTE — LETTER
2023     Steven Pruitt MD  [address] [place] [address]  [address]  [place] [address]    Patient: Venice Lao   YOB: 2005   Date of Visit: 2023       Dear Steven Pruitt MD,    Thank you for referring Venice Lao to me for evaluation. Below is a copy of my consult note.    If you have questions, please do not hesitate to call me. I look forward to following Venice along with you.         Sincerely,        Adela Godinez MD        MATERNAL FETAL MEDICINE Consult Note    Dear Dr Steven Pruitt MD:    Thank you for your kind referral of Venice Lao.  As you know, she is a 18 y.o.   at 31  4/7 weeks gestation (Estimated Date of Delivery: 23). This is a consult.     Her antepartum course is complicated by:  FGR, now intermittently elevated dopplers    Aneuploidy Screening: low risk male    HPI: Today, she denies headache, blurry vision, RUQ pain. No vaginal bleeding, no contractions.     Review of History:  History reviewed. No pertinent past medical history.  History reviewed. No pertinent surgical history.    Social History     Socioeconomic History   • Marital status: Single   Tobacco Use   • Smoking status: Never     Passive exposure: Never   • Smokeless tobacco: Never   Vaping Use   • Vaping Use: Never used   Substance and Sexual Activity   • Alcohol use: Never   • Drug use: Never   • Sexual activity: Yes     Partners: Male     Birth control/protection: None     History reviewed. No pertinent family history.   No Known Allergies   Current Outpatient Medications on File Prior to Visit   Medication Sig Dispense Refill   • prenatal vitamin (prenatal, CLASSIC, vitamin) tablet Take  by mouth Daily.       No current facility-administered medications on file prior to visit.        Past obstetric, gynecological, medical, surgical, family and social history reviewed.  Relevant lab work and imaging reviewed.    Review of systems  Constitutional:  denies fever,  chills, malaise.   ENT/Mouth:  denies sore throat, tinnitis  Eyes: denies vision changes/pain  CV:  denies chest pain  Respiratory:  denies cough/SOB  GI:  denies N/V, diarrhea, abdominal pain.    :   denies dysuria  Skin:  denies lesions or pruritis   Neuro:  denies weakness, focal neurologic symptoms    Vitals:    23 1106   BP: 113/62   BP Location: Left arm   Patient Position: Sitting   Pulse: 75   Temp: 97.8 °F (36.6 °C)   TempSrc: Temporal   Weight: 54.1 kg (119 lb 3.2 oz)       PHYSICAL EXAM   GENERAL: Not in acute distress, AAOx3, pleasant  CARDIO: regular rate and rhythm  PULM: symmetric chest rise, speaking in complete sentences without difficulty  NEURO: awake, alert and oriented to person, place, and time  ABDOMINAL: No fundal tenderness, no rebound or guarding, gravid  EXTREMITIES: no bilateral lower extremity edema/tenderness  SKIN: Warm, well-perfused      ULTRASOUND   Please view full ultrasound note on Imaging tab in [facility].  Cephalic presentation  Right fundal placenta.  GLORIA 20.8, which is normal.  BPP 6/8 (-2 for breathing), but 8/10 with reactive/reassuring NST.  UA dopplers intermittently elevated (2/10 elevated) with no absent or reversed flow.      NST:  Indication: BPP 6/8, FGR  Duration: 30 minutes  Findings: 120/moderate/+acc/-dec  [place] rare irritability  Category 1, reactive/reassuring      ASSESSMENT/COUNSELIN y.o.   at 31  4/7 weeks gestation (Estimated Date of Delivery: 23).    -Pregnancy  [ X ] stable  [   ] improving [  ] worsening    Diagnoses and all orders for this visit:    1. IUGR (intrauterine growth restriction) affecting care of mother, third trimester, fetus 1 (Primary)         FGR, stable intermittently elevated dopplers.    Normal cell free DNA     I think the most likely cause of this is placental insufficiency. Timing of delivery is guided by the results  testing of fetal well being (ultrasound BPP, FHR tracing findings) in addition to  Doppler findings (umbilical artery, MCA, and ductus venosus).  Today we have elevated dopplers intermittently, but a BPP of 8/8.  We discussed that absent and reversed would require admission,  corticosteroids, and possibly delivery depending on gestational age and other testing.         I would recommend limitation of activities, daily fetal movement assessment, and twice weekly ANFS with BPP and Doppler studies.  A worsening of findings on testing would indicate an increased risk of indicated delivery, and inpatient management may be recommended at that time(with steroids).      BPP 6/8 for breathing, but very active fetus and <32 weeks.  With NST, BPP reassuring at 8/10.  Discussed movement precautions and does BPP Thursday with Dr. Franks.       Summary of Plan  -Monday [facility] [place] with dopplers/ Thursday at primary OB for BPP or NST  -Careful attention to fetal movement and symptoms of pre-eclampsia  -Continue to follow with OB for prenatal care     Follow-up:  at [facility] [place] for BPP/dopplers    Thank you for the consult and opportunity to care for this patient.  Please feel free to reach out with any questions or concerns.      I spent 20 minutes caring for this patient on this date of service. This time includes time spent by me in the following activities: preparing for the visit, reviewing tests, obtaining and/or reviewing a separately obtained history, performing a medically appropriate examination and/or evaluation, counseling and educating the patient/family/caregiver and independently interpreting results and communicating that information with the patient/family/caregiver with greater than 50% spent in counseling and coordination of care.       I spent 4 minutes on the separately reported service of US imaging not included in the time used to support the E/M service also reported today.      Adela Godinez MD FACOG  Maternal Fetal Medicine-[facility]  Office:  [phone]  [email or web address]

## 2023-04-27 ENCOUNTER — ROUTINE PRENATAL (OUTPATIENT)
Dept: OBSTETRICS AND GYNECOLOGY | Facility: CLINIC | Age: 18
End: 2023-04-27
Payer: COMMERCIAL

## 2023-04-27 VITALS — SYSTOLIC BLOOD PRESSURE: 118 MMHG | BODY MASS INDEX: 21.51 KG/M2 | DIASTOLIC BLOOD PRESSURE: 64 MMHG | WEIGHT: 117.6 LBS

## 2023-04-27 DIAGNOSIS — Z3A.32 32 WEEKS GESTATION OF PREGNANCY: Primary | ICD-10-CM

## 2023-04-27 LAB
GLUCOSE UR STRIP-MCNC: NEGATIVE MG/DL
PROT UR STRIP-MCNC: NEGATIVE MG/DL

## 2023-04-27 NOTE — PROGRESS NOTES
CC: Obstetric visit    HPI: 18-year-old  2 para 0 presents at 32-0/7 weeks for her obstetric visit.  She has been followed twice weekly for growth restriction.  Ultrasound on Monday at maternal-fetal medicine was 8 out of 10 with a normal amniotic fluid index.  Biophysical profile today was also 8 out of 10 with a normal amniotic fluid index.  The patient reports that her baby is moving actively.  She has no complaints today.    Review of systems    This is negative for abdominal or pelvic pain.  Negative for vaginal bleeding.  Negative for nausea or vomiting.    Physical examination    The abdomen is soft and gravid.  There is no epigastric tenderness.  No fundal tenderness.  No suprapubic tenderness.  Extremities are equal in size    Assessment and plan    1.  Intrauterine pregnancy at 32-0/7 weeks  Intrauterine growth restriction.  Biophysical profile today was 6 out of 8 with a reactive nonstress test and active fetal movement.  This is an 8 out of 10.  We will continue twice weekly biophysical profiles.  The patient will likely have a growth scan with maternal-fetal medicine next week.

## 2023-04-30 NOTE — PROGRESS NOTES
MATERNAL FETAL MEDICINE Consult Note    Dear Dr Afshin Mart MD:    Thank you for your kind referral of Venice Lao.  As you know, she is a 18 y.o.   at 32  4/7 weeks gestation (Estimated Date of Delivery: 23). This is a consult.     Her antepartum course is complicated by:  FGR, now intermittently elevated dopplers    Aneuploidy Screening: low risk male    HPI: Today, she denies headache, blurry vision, RUQ pain. No vaginal bleeding, no contractions.     Review of History:  History reviewed. No pertinent past medical history.  History reviewed. No pertinent surgical history.    Social History     Socioeconomic History   • Marital status: Single   Tobacco Use   • Smoking status: Never     Passive exposure: Never   • Smokeless tobacco: Never   Vaping Use   • Vaping Use: Never used   Substance and Sexual Activity   • Alcohol use: Never   • Drug use: Never   • Sexual activity: Yes     Partners: Male     Birth control/protection: None     History reviewed. No pertinent family history.   No Known Allergies   Current Outpatient Medications on File Prior to Visit   Medication Sig Dispense Refill   • prenatal vitamin (prenatal, CLASSIC, vitamin) tablet Take  by mouth Daily.       No current facility-administered medications on file prior to visit.        Past obstetric, gynecological, medical, surgical, family and social history reviewed.  Relevant lab work and imaging reviewed.    Review of systems  Constitutional:  denies fever, chills, malaise.   ENT/Mouth:  denies sore throat, tinnitis  Eyes: denies vision changes/pain  CV:  denies chest pain  Respiratory:  denies cough/SOB  GI:  denies N/V, diarrhea, abdominal pain.    :   denies dysuria  Skin:  denies lesions or pruritis   Neuro:  denies weakness, focal neurologic symptoms    Vitals:    23 1127   BP: 137/60   BP Location: Right arm   Patient Position: Sitting   Pulse: 73   Resp: 16   Temp: 97.7 °F (36.5 °C)   TempSrc: Temporal  "  Weight: 53.9 kg (118 lb 12.8 oz)   Height: 157.5 cm (62\")       PHYSICAL EXAM   GENERAL: Not in acute distress, AAOx3, pleasant  CARDIO: regular rate and rhythm  PULM: symmetric chest rise, speaking in complete sentences without difficulty  NEURO: awake, alert and oriented to person, place, and time  ABDOMINAL: No fundal tenderness, no rebound or guarding, gravid  EXTREMITIES: no bilateral lower extremity edema/tenderness  SKIN: Warm, well-perfused      ULTRASOUND   Please view full ultrasound note on Imaging tab in ViewPoint.  Cephalic presentation.  Placenta right fundal.  GLORIA 23 cm, which is normal.   EFW 1584 g (18% AC 3%)  UA dopplers intermittently elevated without absent or reversed flow.        ASSESSMENT/COUNSELIN y.o.   at 32  4/7 weeks gestation (Estimated Date of Delivery: 23).    -Pregnancy  [ X ] stable  [   ] improving [  ] worsening    Diagnoses and all orders for this visit:    1. IUGR (intrauterine growth restriction) affecting care of mother, third trimester, fetus 1 (Primary)  -     [facility]; [facility]         FGR, stable intermittently elevated dopplers.    Normal cell free DNA     I think the most likely cause of this is placental insufficiency. Timing of delivery is guided by the results  testing of fetal well being (ultrasound BPP, FHR tracing findings) in addition to Doppler findings (umbilical artery, MCA, and ductus venosus).  Today we have elevated dopplers intermittently, but a BPP of 8/8.  We discussed that absent and reversed would require admission,  corticosteroids, and possibly delivery depending on gestational age and other testing.         I would recommend limitation of activities, daily fetal movement assessment, and twice weekly ANFS with BPP and Doppler studies.  A worsening of findings on testing would indicate an increased risk of indicated delivery, and inpatient management may be recommended at that time(with " steroids).      She is doing well and was scheduled through the rest of her pregnancy.       Summary of Plan  -Monday [facility] with dopplers/ Thursday at primary OB for BPP or NST  -Careful attention to fetal movement and symptoms of pre-eclampsia  -Continue to follow with OB for prenatal care  -Delivery 37 weeks for intermittent elevated dopplers.     Follow-up: Mondays at [facility] for BPP/dopplers    Thank you for the consult and opportunity to care for this patient.  Please feel free to reach out with any questions or concerns.      I spent 15 minutes caring for this patient on this date of service. This time includes time spent by me in the following activities: preparing for the visit, reviewing tests, obtaining and/or reviewing a separately obtained history, performing a medically appropriate examination and/or evaluation, counseling and educating the patient/family/caregiver and independently interpreting results and communicating that information with the patient/family/caregiver with greater than 50% spent in counseling and coordination of care.       I spent 4 minutes on the separately reported service of US imaging not included in the time used to support the E/M service also reported today.      Adela Godinez MD FACOG  Maternal Fetal Medicine-[facility]  Office: [phone]  [email or web address]

## 2023-05-01 ENCOUNTER — HOSPITAL ENCOUNTER (OUTPATIENT)
Dept: ULTRASOUND IMAGING | Facility: HOSPITAL | Age: 18
Discharge: HOME | End: 2023-05-01
Admitting: OBSTETRICS & GYNECOLOGY
Payer: COMMERCIAL

## 2023-05-01 ENCOUNTER — OFFICE VISIT (OUTPATIENT)
Dept: OBSTETRICS AND GYNECOLOGY | Facility: CLINIC | Age: 18
End: 2023-05-01
Payer: COMMERCIAL

## 2023-05-01 VITALS
RESPIRATION RATE: 16 BRPM | WEIGHT: 118.8 LBS | HEIGHT: 62 IN | HEART RATE: 73 BPM | DIASTOLIC BLOOD PRESSURE: 60 MMHG | BODY MASS INDEX: 21.86 KG/M2 | TEMPERATURE: 97.7 F | SYSTOLIC BLOOD PRESSURE: 137 MMHG

## 2023-05-01 DIAGNOSIS — O36.5931 IUGR (INTRAUTERINE GROWTH RESTRICTION) AFFECTING CARE OF MOTHER, THIRD TRIMESTER, FETUS 1: Primary | ICD-10-CM

## 2023-05-01 PROCEDURE — 76816 OB US FOLLOW-UP PER FETUS: CPT

## 2023-05-01 PROCEDURE — 76819 FETAL BIOPHYS PROFIL W/O NST: CPT

## 2023-05-01 PROCEDURE — 76820 UMBILICAL ARTERY ECHO: CPT

## 2023-05-01 NOTE — PROGRESS NOTES
Pt reports that she is doing well and denies vaginal bleeding, cramping, contractions or LOF at this time. Reports active fetal movement. Reviewed when to call OB office or present to L&D for evaluation with symptoms such as decreased fetal movement, vaginal bleeding, LOF or ctxs. Pt verbalized understanding. Denies HA, visual changes or epigastric pain. Denies any additional complaints at time of appointment. Next OB appointment scheduled for 05/04    Vitals:    05/01/23 1127   BP: 137/60   Pulse: 73   Resp: 16   Temp: 97.7 °F (36.5 °C)

## 2023-05-01 NOTE — LETTER
May 1, 2023     Afshin Mart MD  [address]  [facility] [address]  [facility] [address]    Patient: Venice Lao   YOB: 2005   Date of Visit: 2023       Dear Afshin Mart MD,    Thank you for referring Venice Lao to me for evaluation. Below is a copy of my consult note.    If you have questions, please do not hesitate to call me. I look forward to following Venice along with you.         Sincerely,        Adela Godinez MD        CC: No Recipients    MATERNAL FETAL MEDICINE Consult Note    Dear Dr Afshin Mart MD:    Thank you for your kind referral of Venice Lao.  As you know, she is a 18 y.o.   at 31  4/7 weeks gestation (Estimated Date of Delivery: 23). This is a consult.     Her antepartum course is complicated by:  FGR, now intermittently elevated dopplers    Aneuploidy Screening: low risk male    HPI: Today, she denies headache, blurry vision, RUQ pain. No vaginal bleeding, no contractions.     Review of History:  No past medical history on file.  No past surgical history on file.    Social History     Socioeconomic History    Marital status: Single   Tobacco Use    Smoking status: Never     Passive exposure: Never    Smokeless tobacco: Never   Vaping Use    Vaping Use: Never used   Substance and Sexual Activity    Alcohol use: Never    Drug use: Never    Sexual activity: Yes     Partners: Male     Birth control/protection: None     No family history on file.   No Known Allergies   Current Outpatient Medications on File Prior to Visit   Medication Sig Dispense Refill    prenatal vitamin (prenatal, CLASSIC, vitamin) tablet Take  by mouth Daily.       No current facility-administered medications on file prior to visit.        Past obstetric, gynecological, medical, surgical, family and social history reviewed.  Relevant lab work and imaging reviewed.    Review of systems  Constitutional:  denies fever, chills, malaise.   ENT/Mouth:  denies sore throat,  tinnitis  Eyes: denies vision changes/pain  CV:  denies chest pain  Respiratory:  denies cough/SOB  GI:  denies N/V, diarrhea, abdominal pain.    :   denies dysuria  Skin:  denies lesions or pruritis   Neuro:  denies weakness, focal neurologic symptoms    There were no vitals filed for this visit.    PHYSICAL EXAM   GENERAL: Not in acute distress, AAOx3, pleasant  CARDIO: regular rate and rhythm  PULM: symmetric chest rise, speaking in complete sentences without difficulty  NEURO: awake, alert and oriented to person, place, and time  ABDOMINAL: No fundal tenderness, no rebound or guarding, gravid  EXTREMITIES: no bilateral lower extremity edema/tenderness  SKIN: Warm, well-perfused      ULTRASOUND   Please view full ultrasound note on Imaging tab in ViewPoint.  Cephalic presentation  Right fundal placenta.  GLORIA 20.8, which is normal.  BPP 6/8 (-2 for breathing), but 8/10 with reactive/reassuring NST.  UA dopplers intermittently elevated (2/10 elevated) with no absent or reversed flow.      NST:  Indication: BPP 6/8, FGR  Duration: 30 minutes  Findings: 120/moderate/+acc/-dec  [place] rare irritability  Category 1, reactive/reassuring      ASSESSMENT/COUNSELIN y.o.   at 31  4/7 weeks gestation (Estimated Date of Delivery: 23).    -Pregnancy  [ X ] stable  [   ] improving [  ] worsening    There are no diagnoses linked to this encounter.     FGR, stable intermittently elevated dopplers.    Normal cell free DNA     I think the most likely cause of this is placental insufficiency. Timing of delivery is guided by the results  testing of fetal well being (ultrasound BPP, FHR tracing findings) in addition to Doppler findings (umbilical artery, MCA, and ductus venosus).  Today we have elevated dopplers intermittently, but a BPP of 8/8.  We discussed that absent and reversed would require admission,  corticosteroids, and possibly delivery depending on gestational age and other testing.         I  would recommend limitation of activities, daily fetal movement assessment, and twice weekly ANFS with BPP and Doppler studies.  A worsening of findings on testing would indicate an increased risk of indicated delivery, and inpatient management may be recommended at that time(with steroids).      BPP 6/8 for breathing, but very active fetus and <32 weeks.  With NST, BPP reassuring at 8/10.  Discussed movement precautions and does BPP Thursday with Dr. Franks.       Summary of Plan  -Monday [facility] with dopplers/ Thursday at primary OB for BPP or NST  -Careful attention to fetal movement and symptoms of pre-eclampsia  -Continue to follow with OB for prenatal care     Follow-up: Mondays at [facility] for BPP/dopplers    Thank you for the consult and opportunity to care for this patient.  Please feel free to reach out with any questions or concerns.      I spent 20 minutes caring for this patient on this date of service. This time includes time spent by me in the following activities: preparing for the visit, reviewing tests, obtaining and/or reviewing a separately obtained history, performing a medically appropriate examination and/or evaluation, counseling and educating the patient/family/caregiver and independently interpreting results and communicating that information with the patient/family/caregiver with greater than 50% spent in counseling and coordination of care.       I spent 4 minutes on the separately reported service of US imaging not included in the time used to support the E/M service also reported today.      Adela Godinez MD FACOG  Maternal Fetal Medicine-[facility]  Office: [phone]  [email or web address]          Pt reports that she is doing well and denies vaginal bleeding, cramping, contractions or LOF at this time. Reports active fetal movement. Reviewed when to call OB office or present to L&D for evaluation with symptoms such as decreased fetal movement, vaginal bleeding, LOF or  ctxs. Pt verbalized understanding. Denies HA, visual changes or epigastric pain. Denies any additional complaints at time of appointment. Next OB appointment scheduled for 05/04    Vitals:    05/01/23 1127   BP: 137/60   Pulse: 73   Resp: 16   Temp: 97.7 °F (36.5 °C)

## 2023-05-01 NOTE — LETTER
May 1, 2023       No Recipients    Patient: Venice Lao   YOB: 2005   Date of Visit: 2023       Dear Steven Pruitt MD,    Thank you for referring Venice Lao to me for evaluation. Below is a copy of my consult note.    If you have questions, please do not hesitate to call me. I look forward to following Venice along with you.         Sincerely,        Adela Godinez MD    MATERNAL FETAL MEDICINE Consult Note    Dear Dr Afshin Mart MD:    Thank you for your kind referral of Venice Lao.  As you know, she is a 18 y.o.   at 32  4/7 weeks gestation (Estimated Date of Delivery: 23). This is a consult.     Her antepartum course is complicated by:  FGR, now intermittently elevated dopplers    Aneuploidy Screening: low risk male    HPI: Today, she denies headache, blurry vision, RUQ pain. No vaginal bleeding, no contractions.     Review of History:  History reviewed. No pertinent past medical history.  History reviewed. No pertinent surgical history.    Social History     Socioeconomic History   • Marital status: Single   Tobacco Use   • Smoking status: Never     Passive exposure: Never   • Smokeless tobacco: Never   Vaping Use   • Vaping Use: Never used   Substance and Sexual Activity   • Alcohol use: Never   • Drug use: Never   • Sexual activity: Yes     Partners: Male     Birth control/protection: None     History reviewed. No pertinent family history.   No Known Allergies   Current Outpatient Medications on File Prior to Visit   Medication Sig Dispense Refill   • prenatal vitamin (prenatal, CLASSIC, vitamin) tablet Take  by mouth Daily.       No current facility-administered medications on file prior to visit.        Past obstetric, gynecological, medical, surgical, family and social history reviewed.  Relevant lab work and imaging reviewed.    Review of systems  Constitutional:  denies fever, chills, malaise.   ENT/Mouth:  denies sore throat, tinnitis  Eyes:  "denies vision changes/pain  CV:  denies chest pain  Respiratory:  denies cough/SOB  GI:  denies N/V, diarrhea, abdominal pain.    :   denies dysuria  Skin:  denies lesions or pruritis   Neuro:  denies weakness, focal neurologic symptoms    Vitals:    23 1127   BP: 137/60   BP Location: Right arm   Patient Position: Sitting   Pulse: 73   Resp: 16   Temp: 97.7 °F (36.5 °C)   TempSrc: Temporal   Weight: 53.9 kg (118 lb 12.8 oz)   Height: 157.5 cm (62\")       PHYSICAL EXAM   GENERAL: Not in acute distress, AAOx3, pleasant  CARDIO: regular rate and rhythm  PULM: symmetric chest rise, speaking in complete sentences without difficulty  NEURO: awake, alert and oriented to person, place, and time  ABDOMINAL: No fundal tenderness, no rebound or guarding, gravid  EXTREMITIES: no bilateral lower extremity edema/tenderness  SKIN: Warm, well-perfused      ULTRASOUND   Please view full ultrasound note on Imaging tab in ViewPoint.  Cephalic presentation.  Placenta right fundal.  GLORIA 23 cm, which is normal.   EFW 1584 g (18% AC 3%)  UA dopplers intermittently elevated without absent or reversed flow.        ASSESSMENT/COUNSELIN y.o.   at 32  4/7 weeks gestation (Estimated Date of Delivery: 23).    -Pregnancy  [ X ] stable  [   ] improving [  ] worsening    Diagnoses and all orders for this visit:    1. IUGR (intrauterine growth restriction) affecting care of mother, third trimester, fetus 1 (Primary)  -     [facility]; Standing         FGR, stable intermittently elevated dopplers.    Normal cell free DNA     I think the most likely cause of this is placental insufficiency. Timing of delivery is guided by the results  testing of fetal well being (ultrasound BPP, FHR tracing findings) in addition to Doppler findings (umbilical artery, MCA, and ductus venosus).  Today we have elevated dopplers intermittently, but a BPP of 8/8.  We discussed that absent and reversed would require " admission,  corticosteroids, and possibly delivery depending on gestational age and other testing.         I would recommend limitation of activities, daily fetal movement assessment, and twice weekly ANFS with BPP and Doppler studies.  A worsening of findings on testing would indicate an increased risk of indicated delivery, and inpatient management may be recommended at that time(with steroids).      She is doing well and was scheduled through the rest of her pregnancy.       Summary of Plan  -Monday [facility] with dopplers/ Thursday at primary OB for BPP or NST  -Careful attention to fetal movement and symptoms of pre-eclampsia  -Continue to follow with OB for prenatal care  -Delivery 37 weeks for intermittent elevated dopplers.     Follow-up:  at [facility] for BPP/dopplers    Thank you for the consult and opportunity to care for this patient.  Please feel free to reach out with any questions or concerns.      I spent 15 minutes caring for this patient on this date of service. This time includes time spent by me in the following activities: preparing for the visit, reviewing tests, obtaining and/or reviewing a separately obtained history, performing a medically appropriate examination and/or evaluation, counseling and educating the patient/family/caregiver and independently interpreting results and communicating that information with the patient/family/caregiver with greater than 50% spent in counseling and coordination of care.       I spent 4 minutes on the separately reported service of US imaging not included in the time used to support the E/M service also reported today.      Adela Godinez MD FACOG  Maternal Fetal Medicine-[facility]  Office: [phone]  [email or web address]

## 2023-05-04 ENCOUNTER — ROUTINE PRENATAL (OUTPATIENT)
Dept: OBSTETRICS AND GYNECOLOGY | Facility: CLINIC | Age: 18
End: 2023-05-04
Payer: COMMERCIAL

## 2023-05-04 VITALS — SYSTOLIC BLOOD PRESSURE: 120 MMHG | DIASTOLIC BLOOD PRESSURE: 70 MMHG | BODY MASS INDEX: 21.84 KG/M2 | WEIGHT: 119.4 LBS

## 2023-05-04 DIAGNOSIS — Z3A.33 33 WEEKS GESTATION OF PREGNANCY: Primary | ICD-10-CM

## 2023-05-04 NOTE — PROGRESS NOTES
CC: Obstetric visit    HPI: 18-year-old  2 para 0 presents at 33-0/7 weeks for her obstetric visit.  Her baby is moving actively.  She does complain of intermittent chest pain over the last several days.  She reports that this is different from reflux pain and she has not experienced it before.  Denies any cough.  Denies shortness of breath.    Review of systems    This is positive for chest pain.  Negative for shortness of breath.  Negative for nausea or vomiting.  Negative for cough.    Physical examination    The abdomen is soft and gravid.  There is no epigastric tenderness.  No fundal tenderness.  No suprapubic tenderness.  Extremities are equal in size    Assessment and plan    1.  Intrauterine pregnancy at 33-0/7 weeks  2.  Intrauterine growth restriction.  Ultrasound with maternal-fetal medicine was reviewed and discussed with the patient.  This does show interval growth.  Biophysical profile today is 8 out of 8 with normal amniotic fluid index and normal Doppler studies.  We will continue twice-weekly testing between maternal-fetal medicine and our office.  3.  New onset chest pain.  Counseled.  There is no shortness of breath or coughing.  I feel that further work-up should be undertaken and the patient agrees.  She will go to the emergency room today for a full evaluation of this.

## 2023-05-07 NOTE — PROGRESS NOTES
MATERNAL FETAL MEDICINE Consult Note    Dear Dr Steven Pruitt MD:    Thank you for your kind referral of Venice Lao.  As you know, she is a 18 y.o.   at 33  4/7 weeks gestation (Estimated Date of Delivery: 23). This is a consult.     Her antepartum course is complicated by:  FGR, normal today, previously intermittently elevated    Aneuploidy Screening: low risk male    HPI: Today, she denies headache, blurry vision, RUQ pain. No vaginal bleeding, no contractions.     Review of History:  History reviewed. No pertinent past medical history.  History reviewed. No pertinent surgical history.    Social History     Socioeconomic History   • Marital status: Single   Tobacco Use   • Smoking status: Never     Passive exposure: Never   • Smokeless tobacco: Never   Vaping Use   • Vaping Use: Never used   Substance and Sexual Activity   • Alcohol use: Never   • Drug use: Never   • Sexual activity: Yes     Partners: Male     Birth control/protection: None     History reviewed. No pertinent family history.   No Known Allergies   Current Outpatient Medications on File Prior to Visit   Medication Sig Dispense Refill   • prenatal vitamin (prenatal, CLASSIC, vitamin) tablet Take  by mouth Daily.       No current facility-administered medications on file prior to visit.        Past obstetric, gynecological, medical, surgical, family and social history reviewed.  Relevant lab work and imaging reviewed.    Review of systems  Constitutional:  denies fever, chills, malaise.   ENT/Mouth:  denies sore throat, tinnitis  Eyes: denies vision changes/pain  CV:  denies chest pain  Respiratory:  denies cough/SOB  GI:  denies N/V, diarrhea, abdominal pain.    :   denies dysuria  Skin:  denies lesions or pruritis   Neuro:  denies weakness, focal neurologic symptoms    Vitals:    23 1340   BP: 124/70   BP Location: Right arm   Patient Position: Sitting   Pulse: 77   Resp: 16   Temp: 97.8 °F (36.6 °C)   TempSrc: Temporal  "  Weight: 54.3 kg (119 lb 12.8 oz)   Height: 157.5 cm (62\")       PHYSICAL EXAM   GENERAL: Not in acute distress, AAOx3, pleasant  CARDIO: regular rate and rhythm  PULM: symmetric chest rise, speaking in complete sentences without difficulty  NEURO: awake, alert and oriented to person, place, and time  ABDOMINAL: No fundal tenderness, no rebound or guarding, gravid  EXTREMITIES: no bilateral lower extremity edema/tenderness  SKIN: Warm, well-perfused      ULTRASOUND   Please view full ultrasound note on Imaging tab in ViewPoint.  Cephalic presentation.  Fundal placenta.  GLORIA 22.9 cm, which is normal.   BPP 8/8  UA dopplers normal without elevated, absent, or reversed flow.        ASSESSMENT/COUNSELIN y.o.   at 33  4/7 weeks gestation (Estimated Date of Delivery: 23).    -Pregnancy  [ X ] stable  [   ] improving [  ] worsening    Diagnoses and all orders for this visit:    1. IUGR (intrauterine growth restriction) affecting care of mother, third trimester, fetus 1 (Primary)         FGR, stable intermittently elevated dopplers.    Normal cell free DNA     I think the most likely cause of this is placental insufficiency. Timing of delivery is guided by the results  testing of fetal well being (ultrasound BPP, FHR tracing findings) in addition to Doppler findings (umbilical artery, MCA, and ductus venosus).  We have had elevated dopplers intermittently, but they are normal today with a BPP of 8/8.  We discussed that absent and reversed would require admission,  corticosteroids, and possibly delivery depending on gestational age and other testing.         I would recommend limitation of activities, daily fetal movement assessment, and twice weekly ANFS with BPP and Doppler studies.  A worsening of findings on testing would indicate an increased risk of indicated delivery, and inpatient management may be recommended at that time(with steroids).      She is doing well and was scheduled through " the rest of her pregnancy.  Dopplers normal today.       Summary of Plan  -Monday [facility] with dopplers/ Thursday at primary OB for BPP or NST  -Careful attention to fetal movement and symptoms of pre-eclampsia  -Continue to follow with OB for prenatal care  -Delivery 37 weeks for intermittent elevated dopplers.     Follow-up: Mondays at [facility] for BPP/dopplers    Thank you for the consult and opportunity to care for this patient.  Please feel free to reach out with any questions or concerns.      I spent 10 minutes caring for this patient on this date of service. This time includes time spent by me in the following activities: preparing for the visit, reviewing tests, obtaining and/or reviewing a separately obtained history, performing a medically appropriate examination and/or evaluation, counseling and educating the patient/family/caregiver and independently interpreting results and communicating that information with the patient/family/caregiver with greater than 50% spent in counseling and coordination of care.       I spent 4 minutes on the separately reported service of US imaging not included in the time used to support the E/M service also reported today.      Adela Godinez MD FACOG  Maternal Fetal Medicine-[facility]  Office: [phone]  [email or web address]

## 2023-05-08 ENCOUNTER — HOSPITAL ENCOUNTER (OUTPATIENT)
Dept: ULTRASOUND IMAGING | Facility: HOSPITAL | Age: 18
Discharge: HOME | End: 2023-05-08
Admitting: OBSTETRICS & GYNECOLOGY
Payer: COMMERCIAL

## 2023-05-08 ENCOUNTER — OFFICE VISIT (OUTPATIENT)
Dept: OBSTETRICS AND GYNECOLOGY | Facility: CLINIC | Age: 18
End: 2023-05-08
Payer: COMMERCIAL

## 2023-05-08 VITALS
TEMPERATURE: 97.8 F | WEIGHT: 119.8 LBS | BODY MASS INDEX: 22.05 KG/M2 | SYSTOLIC BLOOD PRESSURE: 124 MMHG | RESPIRATION RATE: 16 BRPM | HEIGHT: 62 IN | HEART RATE: 77 BPM | DIASTOLIC BLOOD PRESSURE: 70 MMHG

## 2023-05-08 DIAGNOSIS — O36.5931 IUGR (INTRAUTERINE GROWTH RESTRICTION) AFFECTING CARE OF MOTHER, THIRD TRIMESTER, FETUS 1: Primary | ICD-10-CM

## 2023-05-08 PROCEDURE — 76819 FETAL BIOPHYS PROFIL W/O NST: CPT

## 2023-05-08 PROCEDURE — 76820 UMBILICAL ARTERY ECHO: CPT

## 2023-05-08 NOTE — LETTER
May 8, 2023     Steven Pruitt MD  [address] [place] [address]  [address]  [facility] [address]    Patient: Venice Lao   YOB: 2005   Date of Visit: 2023       Dear Steven Pruitt MD,    Thank you for referring Venice Lao to me for evaluation. Below is a copy of my consult note.    If you have questions, please do not hesitate to call me. I look forward to following Venice along with you.         Sincerely,        Adela Godinez MD      MATERNAL FETAL MEDICINE Consult Note    Dear Dr Steven Pruitt MD:    Thank you for your kind referral of Venice Lao.  As you know, she is a 18 y.o.   at 33  4/7 weeks gestation (Estimated Date of Delivery: 23). This is a consult.     Her antepartum course is complicated by:  FGR, normal today, previously intermittently elevated    Aneuploidy Screening: low risk male    HPI: Today, she denies headache, blurry vision, RUQ pain. No vaginal bleeding, no contractions.     Review of History:  History reviewed. No pertinent past medical history.  History reviewed. No pertinent surgical history.    Social History     Socioeconomic History   • Marital status: Single   Tobacco Use   • Smoking status: Never     Passive exposure: Never   • Smokeless tobacco: Never   Vaping Use   • Vaping Use: Never used   Substance and Sexual Activity   • Alcohol use: Never   • Drug use: Never   • Sexual activity: Yes     Partners: Male     Birth control/protection: None     History reviewed. No pertinent family history.   No Known Allergies   Current Outpatient Medications on File Prior to Visit   Medication Sig Dispense Refill   • prenatal vitamin (prenatal, CLASSIC, vitamin) tablet Take  by mouth Daily.       No current facility-administered medications on file prior to visit.        Past obstetric, gynecological, medical, surgical, family and social history reviewed.  Relevant lab work and imaging reviewed.    Review of systems  Constitutional:  denies fever,  "chills, malaise.   ENT/Mouth:  denies sore throat, tinnitis  Eyes: denies vision changes/pain  CV:  denies chest pain  Respiratory:  denies cough/SOB  GI:  denies N/V, diarrhea, abdominal pain.    :   denies dysuria  Skin:  denies lesions or pruritis   Neuro:  denies weakness, focal neurologic symptoms    Vitals:    23 1340   BP: 124/70   BP Location: Right arm   Patient Position: Sitting   Pulse: 77   Resp: 16   Temp: 97.8 °F (36.6 °C)   TempSrc: Temporal   Weight: 54.3 kg (119 lb 12.8 oz)   Height: 157.5 cm (62\")       PHYSICAL EXAM   GENERAL: Not in acute distress, AAOx3, pleasant  CARDIO: regular rate and rhythm  PULM: symmetric chest rise, speaking in complete sentences without difficulty  NEURO: awake, alert and oriented to person, place, and time  ABDOMINAL: No fundal tenderness, no rebound or guarding, gravid  EXTREMITIES: no bilateral lower extremity edema/tenderness  SKIN: Warm, well-perfused      ULTRASOUND   Please view full ultrasound note on Imaging tab in ViewPoint.  Cephalic presentation.  Fundal placenta.  GLORIA 22.9 cm, which is normal.   BPP 8/8  UA dopplers normal without elevated, absent, or reversed flow.        ASSESSMENT/COUNSELIN y.o.   at 33  4/7 weeks gestation (Estimated Date of Delivery: 23).    -Pregnancy  [ X ] stable  [   ] improving [  ] worsening    Diagnoses and all orders for this visit:    1. IUGR (intrauterine growth restriction) affecting care of mother, third trimester, fetus 1 (Primary)         FGR, stable intermittently elevated dopplers.    Normal cell free DNA     I think the most likely cause of this is placental insufficiency. Timing of delivery is guided by the results  testing of fetal well being (ultrasound BPP, FHR tracing findings) in addition to Doppler findings (umbilical artery, MCA, and ductus venosus).  We have had elevated dopplers intermittently, but they are normal today with a BPP of 8/8.  We discussed that absent and reversed " would require admission,  corticosteroids, and possibly delivery depending on gestational age and other testing.         I would recommend limitation of activities, daily fetal movement assessment, and twice weekly ANFS with BPP and Doppler studies.  A worsening of findings on testing would indicate an increased risk of indicated delivery, and inpatient management may be recommended at that time(with steroids).      She is doing well and was scheduled through the rest of her pregnancy.  Dopplers normal today.       Summary of Plan  - with dopplers/ Thursday at primary OB for BPP or NST  -Careful attention to fetal movement and symptoms of pre-eclampsia  -Continue to follow with OB for prenatal care  -Delivery 37 weeks for intermittent elevated dopplers.     Follow-up:  at [facility] for BPP/dopplers    Thank you for the consult and opportunity to care for this patient.  Please feel free to reach out with any questions or concerns.      I spent 10 minutes caring for this patient on this date of service. This time includes time spent by me in the following activities: preparing for the visit, reviewing tests, obtaining and/or reviewing a separately obtained history, performing a medically appropriate examination and/or evaluation, counseling and educating the patient/family/caregiver and independently interpreting results and communicating that information with the patient/family/caregiver with greater than 50% spent in counseling and coordination of care.       I spent 4 minutes on the separately reported service of US imaging not included in the time used to support the E/M service also reported today.      Adela Godinez MD FACOG  Maternal Fetal Medicine-[facility]  Office: [phone]  [email or web address]

## 2023-05-08 NOTE — PROGRESS NOTES
Pt reports that she is doing well and denies vaginal bleeding, cramping, contractions or LOF at this time. Reports active fetal movement. Reviewed when to call OB office or present to L&D for evaluation with symptoms such as decreased fetal movement, vaginal bleeding, LOF or ctxs. Pt verbalized understanding. Denies HA, visual changes or epigastric pain. Denies any additional complaints at time of appointment.     Vitals:    05/08/23 1340   BP: 124/70   Pulse: 77   Resp: 16   Temp: 97.8 °F (36.6 °C)

## 2023-05-11 ENCOUNTER — ROUTINE PRENATAL (OUTPATIENT)
Dept: OBSTETRICS AND GYNECOLOGY | Facility: CLINIC | Age: 18
End: 2023-05-11
Payer: COMMERCIAL

## 2023-05-11 VITALS — DIASTOLIC BLOOD PRESSURE: 64 MMHG | SYSTOLIC BLOOD PRESSURE: 116 MMHG | WEIGHT: 119.6 LBS | BODY MASS INDEX: 21.88 KG/M2

## 2023-05-11 DIAGNOSIS — Z3A.34 34 WEEKS GESTATION OF PREGNANCY: Primary | ICD-10-CM

## 2023-05-11 LAB
GLUCOSE UR STRIP-MCNC: NEGATIVE MG/DL
PROT UR STRIP-MCNC: NEGATIVE MG/DL

## 2023-05-11 NOTE — PROGRESS NOTES
CC: Obstetric visit    HPI: 18-year-old  2 para 0 presents at 34-0/7 weeks for her obstetric visit.  She sees maternal-fetal medicine on  and me on  due to intrauterine growth restriction.  The baby has made good interval growth.  Biophysical profile today is 8 out of 8 with a normal amniotic fluid index and normal Dopplers.  The patient reports adequate fetal movement.  She has no complaints today.    Review of systems    This is negative for fever or chills.  Negative for nausea or vomiting.  Negative for vaginal bleeding.  Negative for decreased movement.    Physical examination    The abdomen is soft and gravid.  There is no epigastric tenderness.  No fundal tenderness.  No suprapubic tenderness.  Extremities are equal in size with minimal pedal edema    Assessment and plan    1.  Intrauterine pregnancy at 34-0/7 weeks  2.  Intrauterine growth restriction.  The patient does twice weekly BPP's.  These have been normal.  Plan is to continue this with delivery at 37+ weeks, or for nonreassuring test of wellbeing.

## 2023-05-14 NOTE — PROGRESS NOTES
MATERNAL FETAL MEDICINE Consult Note    Dear Dr Steven Pruitt MD:    Thank you for your kind referral of Venice Lao.  As you know, she is a 18 y.o.   at 34  4/7 weeks gestation (Estimated Date of Delivery: 23). This is a consult.     Her antepartum course is complicated by:  FGR, normal today, previously intermittently elevated    Aneuploidy Screening: low risk male    HPI: Today, she denies headache, blurry vision, RUQ pain. No vaginal bleeding, no contractions.     Review of History:  History reviewed. No pertinent past medical history.  History reviewed. No pertinent surgical history.    Social History     Socioeconomic History   • Marital status: Single   Tobacco Use   • Smoking status: Never     Passive exposure: Never   • Smokeless tobacco: Never   Vaping Use   • Vaping Use: Never used   Substance and Sexual Activity   • Alcohol use: Never   • Drug use: Never   • Sexual activity: Yes     Partners: Male     Birth control/protection: None     History reviewed. No pertinent family history.   No Known Allergies   Current Outpatient Medications on File Prior to Visit   Medication Sig Dispense Refill   • prenatal vitamin (prenatal, CLASSIC, vitamin) tablet Take  by mouth Daily.       No current facility-administered medications on file prior to visit.        Past obstetric, gynecological, medical, surgical, family and social history reviewed.  Relevant lab work and imaging reviewed.    Review of systems  Constitutional:  denies fever, chills, malaise.   ENT/Mouth:  denies sore throat, tinnitis  Eyes: denies vision changes/pain  CV:  denies chest pain  Respiratory:  denies cough/SOB  GI:  denies N/V, diarrhea, abdominal pain.    :   denies dysuria  Skin:  denies lesions or pruritis   Neuro:  denies weakness, focal neurologic symptoms    Vitals:    05/15/23 1109   BP: 115/77   BP Location: Right arm   Patient Position: Sitting   Pulse: 80   Resp: 16   Temp: 97.8 °F (36.6 °C)   TempSrc: Temporal  "  Weight: 55 kg (121 lb 3.2 oz)   Height: 157.5 cm (62\")       PHYSICAL EXAM   GENERAL: Not in acute distress, AAOx3, pleasant  CARDIO: regular rate and rhythm  PULM: symmetric chest rise, speaking in complete sentences without difficulty  NEURO: awake, alert and oriented to person, place, and time  ABDOMINAL: No fundal tenderness, no rebound or guarding, gravid  EXTREMITIES: no bilateral lower extremity edema/tenderness  SKIN: Warm, well-perfused      ULTRASOUND   Please view full ultrasound note on Imaging tab in ViewPoint.  Cephalic presentation.  Placenta right fundal.  GLORIA 18 cm, which is normal.    EFW 9% AC 2%,   BPP 8/8.  UA dopplers normal without elevated, absent, or reversed flow      ASSESSMENT/COUNSELIN y.o.   at 34  4/7 weeks gestation (Estimated Date of Delivery: 23).    -Pregnancy  [ X ] stable  [   ] improving [  ] worsening    Diagnoses and all orders for this visit:    1. IUGR (intrauterine growth restriction) affecting care of mother, third trimester, fetus 1 (Primary)         FGR, stable intermittently elevated dopplers.    Normal cell free DNA     I think the most likely cause of this is placental insufficiency. Timing of delivery is guided by the results  testing of fetal well being (ultrasound BPP, FHR tracing findings) in addition to Doppler findings (umbilical artery, MCA, and ductus venosus).  We have had elevated dopplers intermittently, but they are normal today with a BPP of 8/8.  We discussed that absent and reversed would require admission,  corticosteroids, and possibly delivery depending on gestational age and other testing.         I would recommend limitation of activities, daily fetal movement assessment, and twice weekly ANFS with BPP and Doppler studies.  A worsening of findings on testing would indicate an increased risk of indicated delivery, and inpatient management may be recommended at that time(with steroids).      She is doing well and was " scheduled through the rest of her pregnancy.  Dopplers normal today.      Recommend delivery 37-38 weeks.      Summary of Plan  -Monday [facility] with dopplers/ Thursday at primary OB for BPP or NST  -Careful attention to fetal movement and symptoms of pre-eclampsia  -Continue to follow with OB for prenatal care  -Delivery 37th week (37-38) for intermittent elevated dopplers.     Follow-up: Mondays at [facility] for BPP/dopplers    Thank you for the consult and opportunity to care for this patient.  Please feel free to reach out with any questions or concerns.      I spent 10 minutes caring for this patient on this date of service. This time includes time spent by me in the following activities: preparing for the visit, reviewing tests, obtaining and/or reviewing a separately obtained history, performing a medically appropriate examination and/or evaluation, counseling and educating the patient/family/caregiver and independently interpreting results and communicating that information with the patient/family/caregiver with greater than 50% spent in counseling and coordination of care.       I spent 4 minutes on the separately reported service of US imaging not included in the time used to support the E/M service also reported today.      Adela Godinez MD FACOG  Maternal Fetal Medicine-[facility]  Office: [phone]  [email or web address]

## 2023-05-15 ENCOUNTER — OFFICE VISIT (OUTPATIENT)
Dept: OBSTETRICS AND GYNECOLOGY | Facility: CLINIC | Age: 18
End: 2023-05-15
Payer: COMMERCIAL

## 2023-05-15 ENCOUNTER — HOSPITAL ENCOUNTER (OUTPATIENT)
Dept: ULTRASOUND IMAGING | Facility: HOSPITAL | Age: 18
Discharge: HOME | End: 2023-05-15
Admitting: OBSTETRICS & GYNECOLOGY
Payer: COMMERCIAL

## 2023-05-15 VITALS
TEMPERATURE: 97.8 F | SYSTOLIC BLOOD PRESSURE: 115 MMHG | RESPIRATION RATE: 16 BRPM | DIASTOLIC BLOOD PRESSURE: 77 MMHG | WEIGHT: 121.2 LBS | BODY MASS INDEX: 22.31 KG/M2 | HEART RATE: 80 BPM | HEIGHT: 62 IN

## 2023-05-15 DIAGNOSIS — O36.5931 IUGR (INTRAUTERINE GROWTH RESTRICTION) AFFECTING CARE OF MOTHER, THIRD TRIMESTER, FETUS 1: Primary | ICD-10-CM

## 2023-05-15 PROCEDURE — 76816 OB US FOLLOW-UP PER FETUS: CPT

## 2023-05-15 PROCEDURE — 76819 FETAL BIOPHYS PROFIL W/O NST: CPT

## 2023-05-15 PROCEDURE — 76820 UMBILICAL ARTERY ECHO: CPT

## 2023-05-15 NOTE — PROGRESS NOTES
Pt reports that she is doing well and denies vaginal bleeding, cramping, contractions or LOF at this time. Reports active fetal movement. Denies HA, visual changes or epigastric pain. Denies any additional complaints at time of appointment. Next OB appointment scheduled for 05/18    Vitals:    05/15/23 1109   BP: 115/77   Pulse: 80   Resp: 16   Temp: 97.8 °F (36.6 °C)

## 2023-05-15 NOTE — LETTER
May 15, 2023     Steven Pruitt MD  [address] [place] [address]  [address]  [facility] [address]    Patient: Venice Lao   YOB: 2005   Date of Visit: 5/15/2023       Dear Steven Pruitt MD,    Thank you for referring Venice Lao to me for evaluation. Below is a copy of my consult note.    If you have questions, please do not hesitate to call me. I look forward to following Venice along with you.         Sincerely,        Adela Godinez MD      MATERNAL FETAL MEDICINE Consult Note    Dear Dr Steven Pruitt MD:    Thank you for your kind referral of Venice Lao.  As you know, she is a 18 y.o.   at 34  4/7 weeks gestation (Estimated Date of Delivery: 23). This is a consult.     Her antepartum course is complicated by:  FGR, normal today, previously intermittently elevated    Aneuploidy Screening: low risk male    HPI: Today, she denies headache, blurry vision, RUQ pain. No vaginal bleeding, no contractions.     Review of History:  History reviewed. No pertinent past medical history.  History reviewed. No pertinent surgical history.    Social History     Socioeconomic History   • Marital status: Single   Tobacco Use   • Smoking status: Never     Passive exposure: Never   • Smokeless tobacco: Never   Vaping Use   • Vaping Use: Never used   Substance and Sexual Activity   • Alcohol use: Never   • Drug use: Never   • Sexual activity: Yes     Partners: Male     Birth control/protection: None     History reviewed. No pertinent family history.   No Known Allergies   Current Outpatient Medications on File Prior to Visit   Medication Sig Dispense Refill   • prenatal vitamin (prenatal, CLASSIC, vitamin) tablet Take  by mouth Daily.       No current facility-administered medications on file prior to visit.        Past obstetric, gynecological, medical, surgical, family and social history reviewed.  Relevant lab work and imaging reviewed.    Review of systems  Constitutional:  denies  "fever, chills, malaise.   ENT/Mouth:  denies sore throat, tinnitis  Eyes: denies vision changes/pain  CV:  denies chest pain  Respiratory:  denies cough/SOB  GI:  denies N/V, diarrhea, abdominal pain.    :   denies dysuria  Skin:  denies lesions or pruritis   Neuro:  denies weakness, focal neurologic symptoms    Vitals:    05/15/23 1109   BP: 115/77   BP Location: Right arm   Patient Position: Sitting   Pulse: 80   Resp: 16   Temp: 97.8 °F (36.6 °C)   TempSrc: Temporal   Weight: 55 kg (121 lb 3.2 oz)   Height: 157.5 cm (62\")       PHYSICAL EXAM   GENERAL: Not in acute distress, AAOx3, pleasant  CARDIO: regular rate and rhythm  PULM: symmetric chest rise, speaking in complete sentences without difficulty  NEURO: awake, alert and oriented to person, place, and time  ABDOMINAL: No fundal tenderness, no rebound or guarding, gravid  EXTREMITIES: no bilateral lower extremity edema/tenderness  SKIN: Warm, well-perfused      ULTRASOUND   Please view full ultrasound note on Imaging tab in ViewPoint.  Cephalic presentation.  Placenta right fundal.  GLORIA 18 cm, which is normal.    EFW 9% AC 2%,   BPP 8/8.  UA dopplers normal without elevated, absent, or reversed flow      ASSESSMENT/COUNSELIN y.o.   at 34  4/7 weeks gestation (Estimated Date of Delivery: 23).    -Pregnancy  [ X ] stable  [   ] improving [  ] worsening    Diagnoses and all orders for this visit:    1. IUGR (intrauterine growth restriction) affecting care of mother, third trimester, fetus 1 (Primary)         FGR, stable intermittently elevated dopplers.    Normal cell free DNA     I think the most likely cause of this is placental insufficiency. Timing of delivery is guided by the results  testing of fetal well being (ultrasound BPP, FHR tracing findings) in addition to Doppler findings (umbilical artery, MCA, and ductus venosus).  We have had elevated dopplers intermittently, but they are normal today with a BPP of 8/8.  We discussed that " absent and reversed would require admission,  corticosteroids, and possibly delivery depending on gestational age and other testing.         I would recommend limitation of activities, daily fetal movement assessment, and twice weekly ANFS with BPP and Doppler studies.  A worsening of findings on testing would indicate an increased risk of indicated delivery, and inpatient management may be recommended at that time(with steroids).      She is doing well and was scheduled through the rest of her pregnancy.  Dopplers normal today.      Recommend delivery 37-38 weeks.      Summary of Plan  -Monday [facility] with dopplers/ Thursday at primary OB for BPP or NST  -Careful attention to fetal movement and symptoms of pre-eclampsia  -Continue to follow with OB for prenatal care  -Delivery 37th week (37-38) for intermittent elevated dopplers.     Follow-up:  at [facility] for BPP/dopplers    Thank you for the consult and opportunity to care for this patient.  Please feel free to reach out with any questions or concerns.      I spent 10 minutes caring for this patient on this date of service. This time includes time spent by me in the following activities: preparing for the visit, reviewing tests, obtaining and/or reviewing a separately obtained history, performing a medically appropriate examination and/or evaluation, counseling and educating the patient/family/caregiver and independently interpreting results and communicating that information with the patient/family/caregiver with greater than 50% spent in counseling and coordination of care.       I spent 4 minutes on the separately reported service of US imaging not included in the time used to support the E/M service also reported today.      Adela Godinez MD [facility]  Maternal Fetal Medicine-[facility]  Office: [phone]  [email or web address]

## 2023-05-19 ENCOUNTER — ROUTINE PRENATAL (OUTPATIENT)
Dept: OBSTETRICS AND GYNECOLOGY | Facility: CLINIC | Age: 18
End: 2023-05-19
Payer: COMMERCIAL

## 2023-05-19 VITALS — SYSTOLIC BLOOD PRESSURE: 111 MMHG | BODY MASS INDEX: 22.31 KG/M2 | WEIGHT: 122 LBS | DIASTOLIC BLOOD PRESSURE: 70 MMHG

## 2023-05-19 DIAGNOSIS — O36.5990 FETAL GROWTH RESTRICTION ANTEPARTUM: ICD-10-CM

## 2023-05-19 DIAGNOSIS — Z34.03 PRENATAL CARE, FIRST PREGNANCY, THIRD TRIMESTER: ICD-10-CM

## 2023-05-19 DIAGNOSIS — Z3A.35 35 WEEKS GESTATION OF PREGNANCY: Primary | ICD-10-CM

## 2023-05-19 LAB
GLUCOSE UR STRIP-MCNC: NEGATIVE MG/DL
PROT UR STRIP-MCNC: NEGATIVE MG/DL

## 2023-05-19 NOTE — PROGRESS NOTES
Chief Complaint   Patient presents with   • Routine Prenatal Visit       OB follow up     Venice Lao is a 18 y.o.  35w1d being seen today for her obstetrical visit.  Patient reports no complaints. Fetal movement: normal.    Review of Systems  Cramping/contractions: denies  Vaginal bleeding: denies  Fetal movement: normal    /70   Wt 55.3 kg (122 lb)   BMI 22.31 kg/m²     Assessment/Plan    Diagnoses and all orders for this visit:    1. 35 weeks gestation of pregnancy (Primary)    2. Fetal growth restriction antepartum    3. Prenatal care, first pregnancy, third trimester  -     POC Urinalysis Dipstick       BPP 8/8, GLORIA 17.40cm, Umbilical artery S/D ratio = 2.78 with no reverse or absent flow  Reviewed fetal kick counts  Reviewed S&S PTL  Reviewed this stage of pregnancy  The patient completes twice weekly BPP's.  These have been normal.  Plan is to continue this with delivery at 37+ weeks, or for nonreassuring test of wellbeing  GBS at next visit  Problem list updated     Follow up in 1 week(s) with Dr. Pruitt    I spent 31 minutes caring for Venice on this date of service. This time includes time spent by me in the following activities: preparing for the visit, reviewing tests, obtaining and/or reviewing a separately obtained history, performing a medically appropriate examination and/or evaluation, counseling and educating the patient/family/caregiver, ordering medications, tests, or procedures, referring and communicating with other health care professionals and documenting information in the medical record    Adela Malave, APRN  2023  11:56 EDT

## 2023-05-22 ENCOUNTER — HOSPITAL ENCOUNTER (OUTPATIENT)
Dept: ULTRASOUND IMAGING | Facility: HOSPITAL | Age: 18
Discharge: HOME | End: 2023-05-22
Admitting: OBSTETRICS & GYNECOLOGY
Payer: COMMERCIAL

## 2023-05-22 VITALS
TEMPERATURE: 98.4 F | HEART RATE: 83 BPM | BODY MASS INDEX: 22.64 KG/M2 | WEIGHT: 123.8 LBS | DIASTOLIC BLOOD PRESSURE: 77 MMHG | SYSTOLIC BLOOD PRESSURE: 121 MMHG

## 2023-05-22 PROCEDURE — 76819 FETAL BIOPHYS PROFIL W/O NST: CPT

## 2023-05-22 PROCEDURE — 76820 UMBILICAL ARTERY ECHO: CPT

## 2023-05-22 NOTE — NURSING NOTE
Pt reports that she is doing well and denies vaginal bleeding or LOF at this time. Reports occasional abdominal tightness, denies consistency. Reports active fetal movement. Reviewed when to call OB office or present to L&D for evaluation with symptoms such as decreased fetal movement, vaginal bleeding, LOF or ctxs. Pt verbalized understanding. Denies HA, visual changes or epigastric pain. Denies any additional complaints at time of appointment. Next OB appointment scheduled for 5/25.    Vitals:    05/22/23 1212   BP: 121/77   Pulse: 83   Temp: 98.4 °F (36.9 °C)

## 2023-05-25 ENCOUNTER — ROUTINE PRENATAL (OUTPATIENT)
Dept: OBSTETRICS AND GYNECOLOGY | Facility: CLINIC | Age: 18
End: 2023-05-25
Payer: COMMERCIAL

## 2023-05-25 VITALS — BODY MASS INDEX: 22.72 KG/M2 | SYSTOLIC BLOOD PRESSURE: 110 MMHG | WEIGHT: 124.2 LBS | DIASTOLIC BLOOD PRESSURE: 62 MMHG

## 2023-05-25 DIAGNOSIS — O36.5931 IUGR (INTRAUTERINE GROWTH RESTRICTION) AFFECTING CARE OF MOTHER, THIRD TRIMESTER, FETUS 1: ICD-10-CM

## 2023-05-25 DIAGNOSIS — Z3A.36 36 WEEKS GESTATION OF PREGNANCY: Primary | ICD-10-CM

## 2023-05-25 LAB
GLUCOSE UR STRIP-MCNC: NEGATIVE MG/DL
PROT UR STRIP-MCNC: NEGATIVE MG/DL

## 2023-05-25 NOTE — PROGRESS NOTES
CC: Obstetric visit    HPI: 18-year-old  2 para 0 presents at 36-0/7 weeks for her obstetric visit.  Her baby is moving actively.  Biophysical profile today is 8 out of 8 with a normal amniotic fluid index and no reversed end-diastolic flow.  The patient reports occasional contractions.    Review of systems    This is negative for vaginal bleeding.  Negative for pelvic pain.  Positive for intermittent contractions.    Physical examination    The abdomen is soft and gravid.  There is no epigastric tenderness.  No fundal tenderness.  No suprapubic tenderness.  Pelvic examination reveals normal female external genitalia.  There is no blood in the vaginal vault.  The cervix is 50% effaced and 0.5 cm dilated  Extremities are equal in size    Assessment and plan    1.  Intrauterine pregnancy at 36-0/7 weeks  2.  Group B strep cultures performed today.  Counseled.  3.  Labor warnings and rupture membrane warnings given.  4.  Intrauterine growth restriction.  Biophysical profile is 8 out of 8 with a normal amniotic fluid index.  Dopplers are reassuring.  I counseled the patient and answered her questions.  We will continue twice weekly biophysical profiles.  Induction of labor is scheduled for .

## 2023-05-29 LAB — B-HEM STREP SPEC QL CULT: NEGATIVE

## 2023-05-29 NOTE — PROGRESS NOTES
MATERNAL FETAL MEDICINE Consult Note    Dear Dr Steven Pruitt MD:    Thank you for your kind referral of Venice Lao.  As you know, she is a 18 y.o.   at 36  5/7 weeks gestation (Estimated Date of Delivery: 23). This is a consult.     Her antepartum course is complicated by:  FGR,  intermittently elevated dopplers    Aneuploidy Screening: low risk male    HPI: Today, she denies headache, blurry vision, RUQ pain. No vaginal bleeding, no contractions.     Review of History:  History reviewed. No pertinent past medical history.  History reviewed. No pertinent surgical history.    Social History     Socioeconomic History   • Marital status: Single   Tobacco Use   • Smoking status: Never     Passive exposure: Never   • Smokeless tobacco: Never   Vaping Use   • Vaping Use: Never used   Substance and Sexual Activity   • Alcohol use: Never   • Drug use: Never   • Sexual activity: Yes     Partners: Male     Birth control/protection: None     History reviewed. No pertinent family history.   No Known Allergies   Current Outpatient Medications on File Prior to Visit   Medication Sig Dispense Refill   • prenatal vitamin (prenatal, CLASSIC, vitamin) tablet Take  by mouth Daily.       No current facility-administered medications on file prior to visit.        Past obstetric, gynecological, medical, surgical, family and social history reviewed.  Relevant lab work and imaging reviewed.    Review of systems  Constitutional:  denies fever, chills, malaise.   ENT/Mouth:  denies sore throat, tinnitis  Eyes: denies vision changes/pain  CV:  denies chest pain  Respiratory:  denies cough/SOB  GI:  denies N/V, diarrhea, abdominal pain.    :   denies dysuria  Skin:  denies lesions or pruritis   Neuro:  denies weakness, focal neurologic symptoms    Vitals:    23 0915   BP: 111/72   BP Location: Right arm   Patient Position: Sitting   Pulse: 67   Resp: 16   Temp: 97.8 °F (36.6 °C)   TempSrc: Temporal   Weight: 56.5  "kg (124 lb 9.6 oz)   Height: 157.5 cm (62\")       PHYSICAL EXAM   GENERAL: Not in acute distress, AAOx3, pleasant  CARDIO: regular rate and rhythm  PULM: symmetric chest rise, speaking in complete sentences without difficulty  NEURO: awake, alert and oriented to person, place, and time  ABDOMINAL: No fundal tenderness, no rebound or guarding, gravid  EXTREMITIES: no bilateral lower extremity edema/tenderness  SKIN: Warm, well-perfused      ULTRASOUND   Please view full ultrasound note on Imaging tab in ViewPoint.  Cephalic presentation.  Right fundal placenta.  GLORIA 22 cm, which is normal.   BPP 8/8  UA Dopper intermittently elevated, but mostly normal (2/10 elevated)  No absent or reversed flow.        ASSESSMENT/COUNSELIN y.o.   at 36  5/7 weeks gestation (Estimated Date of Delivery: 23).    -Pregnancy  [ X ] stable  [   ] improving [  ] worsening    Diagnoses and all orders for this visit:    1. IUGR (intrauterine growth restriction) affecting care of mother, third trimester, fetus 1 (Primary)    2. Fetal growth restriction antepartum         FGR, stable intermittently elevated dopplers.    Normal cell free DNA     I think the most likely cause of this is placental insufficiency. Timing of delivery is guided by the results  testing of fetal well being (ultrasound BPP, FHR tracing findings) in addition to Doppler findings (umbilical artery, MCA, and ductus venosus).  We have had elevated dopplers intermittently with BPP of 8/8.  She is doing well and was scheduled through the rest of her pregnancy.  Dopplers normal today.      Recommend delivery 37-38 weeks.      Summary of Plan  -Monday [facility] with dopplers/ Thursday at primary OB for BPP or NST  -Careful attention to fetal movement and symptoms of pre-eclampsia  -Continue to follow with OB for prenatal care  -Delivery 37th week (37-38) for intermittent elevated dopplers--scheduled in 1 week     Follow-up:  at [facility] for BPP/dopplers    Thank " you for the consult and opportunity to care for this patient.  Please feel free to reach out with any questions or concerns.      I spent 10 minutes caring for this patient on this date of service. This time includes time spent by me in the following activities: preparing for the visit, reviewing tests, obtaining and/or reviewing a separately obtained history, performing a medically appropriate examination and/or evaluation, counseling and educating the patient/family/caregiver and independently interpreting results and communicating that information with the patient/family/caregiver with greater than 50% spent in counseling and coordination of care.       I spent 4 minutes on the separately reported service of US imaging not included in the time used to support the E/M service also reported today.      Adela Godinez MD [facility]  Maternal Fetal Medicine-[facility]  Office: [phone]  [email or web address]

## 2023-05-30 ENCOUNTER — OFFICE VISIT (OUTPATIENT)
Dept: OBSTETRICS AND GYNECOLOGY | Facility: CLINIC | Age: 18
End: 2023-05-30

## 2023-05-30 ENCOUNTER — HOSPITAL ENCOUNTER (OUTPATIENT)
Dept: ULTRASOUND IMAGING | Facility: HOSPITAL | Age: 18
Discharge: HOME | End: 2023-05-30
Admitting: OBSTETRICS & GYNECOLOGY

## 2023-05-30 VITALS
RESPIRATION RATE: 16 BRPM | TEMPERATURE: 97.8 F | DIASTOLIC BLOOD PRESSURE: 72 MMHG | HEIGHT: 62 IN | SYSTOLIC BLOOD PRESSURE: 111 MMHG | BODY MASS INDEX: 22.93 KG/M2 | WEIGHT: 124.6 LBS | HEART RATE: 67 BPM

## 2023-05-30 DIAGNOSIS — O36.5990 FETAL GROWTH RESTRICTION ANTEPARTUM: ICD-10-CM

## 2023-05-30 DIAGNOSIS — O36.5931 IUGR (INTRAUTERINE GROWTH RESTRICTION) AFFECTING CARE OF MOTHER, THIRD TRIMESTER, FETUS 1: Primary | ICD-10-CM

## 2023-05-30 PROCEDURE — 76820 UMBILICAL ARTERY ECHO: CPT

## 2023-05-30 PROCEDURE — 76819 FETAL BIOPHYS PROFIL W/O NST: CPT

## 2023-05-30 NOTE — PROGRESS NOTES
Pt reports that she is doing well and denies vaginal bleeding, cramping, contractions or LOF at this time. Reports active fetal movement. Reviewed when to call OB office or present to L&D for evaluation with symptoms such as decreased fetal movement, vaginal bleeding, LOF or ctxs. Pt verbalized understanding. Denies HA, visual changes or epigastric pain. Denies any additional complaints at time of appointment. Next OB appointment scheduled for 06/01    Vitals:    05/30/23 0915   BP: 111/72   Pulse: 67   Resp: 16   Temp: 97.8 °F (36.6 °C)

## 2023-05-30 NOTE — LETTER
May 30, 2023     Steven Pruitt MD  [address] [place] [address]  [address]  [facility] [address]    Patient: Venice Lao   YOB: 2005   Date of Visit: 2023       Dear Steven Pruitt MD,    Thank you for referring Venice Lao to me for evaluation. Below is a copy of my consult note.    If you have questions, please do not hesitate to call me. I look forward to following Venice along with you.         Sincerely,        Adela Godinez MD      MATERNAL FETAL MEDICINE Consult Note    Dear Dr Steven Pruitt MD:    Thank you for your kind referral of Venice Lao.  As you know, she is a 18 y.o.   at 36  5/7 weeks gestation (Estimated Date of Delivery: 23). This is a consult.     Her antepartum course is complicated by:  FGR,  intermittently elevated dopplers    Aneuploidy Screening: low risk male    HPI: Today, she denies headache, blurry vision, RUQ pain. No vaginal bleeding, no contractions.     Review of History:  History reviewed. No pertinent past medical history.  History reviewed. No pertinent surgical history.    Social History     Socioeconomic History   • Marital status: Single   Tobacco Use   • Smoking status: Never     Passive exposure: Never   • Smokeless tobacco: Never   Vaping Use   • Vaping Use: Never used   Substance and Sexual Activity   • Alcohol use: Never   • Drug use: Never   • Sexual activity: Yes     Partners: Male     Birth control/protection: None     History reviewed. No pertinent family history.   No Known Allergies   Current Outpatient Medications on File Prior to Visit   Medication Sig Dispense Refill   • prenatal vitamin (prenatal, CLASSIC, vitamin) tablet Take  by mouth Daily.       No current facility-administered medications on file prior to visit.        Past obstetric, gynecological, medical, surgical, family and social history reviewed.  Relevant lab work and imaging reviewed.    Review of systems  Constitutional:  denies fever, chills,  "malaise.   ENT/Mouth:  denies sore throat, tinnitis  Eyes: denies vision changes/pain  CV:  denies chest pain  Respiratory:  denies cough/SOB  GI:  denies N/V, diarrhea, abdominal pain.    :   denies dysuria  Skin:  denies lesions or pruritis   Neuro:  denies weakness, focal neurologic symptoms    Vitals:    23 0915   BP: 111/72   BP Location: Right arm   Patient Position: Sitting   Pulse: 67   Resp: 16   Temp: 97.8 °F (36.6 °C)   TempSrc: Temporal   Weight: 56.5 kg (124 lb 9.6 oz)   Height: 157.5 cm (62\")       PHYSICAL EXAM   GENERAL: Not in acute distress, AAOx3, pleasant  CARDIO: regular rate and rhythm  PULM: symmetric chest rise, speaking in complete sentences without difficulty  NEURO: awake, alert and oriented to person, place, and time  ABDOMINAL: No fundal tenderness, no rebound or guarding, gravid  EXTREMITIES: no bilateral lower extremity edema/tenderness  SKIN: Warm, well-perfused      ULTRASOUND   Please view full ultrasound note on Imaging tab in ViewPoint.  Cephalic presentation.  Right fundal placenta.  GLORIA 22 cm, which is normal.   BPP 8/8  UA Dopper intermittently elevated, but mostly normal (2/10 elevated)  No absent or reversed flow.        ASSESSMENT/COUNSELIN y.o.   at 36  5/7 weeks gestation (Estimated Date of Delivery: 23).    -Pregnancy  [ X ] stable  [   ] improving [  ] worsening    Diagnoses and all orders for this visit:    1. IUGR (intrauterine growth restriction) affecting care of mother, third trimester, fetus 1 (Primary)    2. Fetal growth restriction antepartum         FGR, stable intermittently elevated dopplers.    Normal cell free DNA     I think the most likely cause of this is placental insufficiency. Timing of delivery is guided by the results  testing of fetal well being (ultrasound BPP, FHR tracing findings) in addition to Doppler findings (umbilical artery, MCA, and ductus venosus).  We have had elevated dopplers intermittently with BPP of 8/8.  " She is doing well and was scheduled through the rest of her pregnancy.  Dopplers normal today.      Recommend delivery 37-38 weeks.      Summary of Plan  -Monday [facility] with dopplers/ Thursday at primary OB for BPP or NST  -Careful attention to fetal movement and symptoms of pre-eclampsia  -Continue to follow with OB for prenatal care  -Delivery 37th week (37-38) for intermittent elevated dopplers--scheduled in 1 week     Follow-up: Mondays at [facility] for BPP/dopplers    Thank you for the consult and opportunity to care for this patient.  Please feel free to reach out with any questions or concerns.      I spent 10 minutes caring for this patient on this date of service. This time includes time spent by me in the following activities: preparing for the visit, reviewing tests, obtaining and/or reviewing a separately obtained history, performing a medically appropriate examination and/or evaluation, counseling and educating the patient/family/caregiver and independently interpreting results and communicating that information with the patient/family/caregiver with greater than 50% spent in counseling and coordination of care.       I spent 4 minutes on the separately reported service of US imaging not included in the time used to support the E/M service also reported today.      Adela Godinez MD FACOG  Maternal Fetal Medicine-[facility]  Office: [phone]  [email or web address]

## 2023-06-01 ENCOUNTER — ROUTINE PRENATAL (OUTPATIENT)
Dept: OBSTETRICS AND GYNECOLOGY | Facility: CLINIC | Age: 18
End: 2023-06-01

## 2023-06-01 VITALS — DIASTOLIC BLOOD PRESSURE: 67 MMHG | SYSTOLIC BLOOD PRESSURE: 118 MMHG | WEIGHT: 125.8 LBS | BODY MASS INDEX: 23.01 KG/M2

## 2023-06-01 DIAGNOSIS — Z3A.37 37 WEEKS GESTATION OF PREGNANCY: Primary | ICD-10-CM

## 2023-06-01 DIAGNOSIS — O36.5931 IUGR (INTRAUTERINE GROWTH RESTRICTION) AFFECTING CARE OF MOTHER, THIRD TRIMESTER, FETUS 1: ICD-10-CM

## 2023-06-01 NOTE — PROGRESS NOTES
: Obstetric visit    HPI: 18-year-old  2 para 0 presents at 37-0/7 weeks for her obstetric visit.  Her baby is moving actively.  She has intermittent contractions.    Review of systems    This is negative for fever or chills.  Negative for nausea or vomiting.  Negative for vaginal bleeding.    Physical examination    The abdomen is soft and gravid.  There is no epigastric tenderness.  No fundal tenderness.  Extremities are equal in size with minimal pedal edema    Assessment and plan    1.  Intrauterine pregnancy at 37-0/7 weeks  2.  Intrauterine growth restriction.  Biophysical profile today is 8 out of 8 with a normal amniotic fluid index.  We reviewed maternal-fetal medicine recommendations for delivery between 37 and 38 weeks.  The patient agrees.  Induction of labor has been scheduled for Tuesday.  Because the patient cervix has been unfavorable, I recommend a Cytotec induction.  I counseled the patient regarding the procedure itself as well as its benefits, risks and alternatives.  The off-label nature of the use of Cytotec was also discussed.  I answered the patient's questions and she agrees to proceed.  She also understands to come to labor and delivery sooner than Tuesday if she experiences regular painful contractions, rupture of membranes or decreased fetal movement.

## 2023-06-04 NOTE — PROGRESS NOTES
MATERNAL FETAL MEDICINE Consult Note    Dear Dr Steven Pruitt MD:    Thank you for your kind referral of [place] Lao.  As you know, she is a 18 y.o.   at 37  4/7 weeks gestation (Estimated Date of Delivery: 23). This is a consult.     Her antepartum course is complicated by:  FGR,  intermittently elevated dopplers    Aneuploidy Screening: low risk male    HPI: Today, she denies headache, blurry vision, RUQ pain. No vaginal bleeding, no contractions.     Review of History:  History reviewed. No pertinent past medical history.  History reviewed. No pertinent surgical history.    Social History     Socioeconomic History    Marital status: Single   Tobacco Use    Smoking status: Never     Passive exposure: Never    Smokeless tobacco: Never   Vaping Use    Vaping Use: Never used   Substance and Sexual Activity    Alcohol use: Never    Drug use: Never    Sexual activity: Yes     Partners: Male     Birth control/protection: None     History reviewed. No pertinent family history.   No Known Allergies   Current Outpatient Medications on File Prior to Visit   Medication Sig Dispense Refill    prenatal vitamin (prenatal, CLASSIC, vitamin) tablet Take  by mouth Daily.       No current facility-administered medications on file prior to visit.        Past obstetric, gynecological, medical, surgical, family and social history reviewed.  Relevant lab work and imaging reviewed.    Review of systems  Constitutional:  denies fever, chills, malaise.   ENT/Mouth:  denies sore throat, tinnitis  Eyes: denies vision changes/pain  CV:  denies chest pain  Respiratory:  denies cough/SOB  GI:  denies N/V, diarrhea, abdominal pain.    :   denies dysuria  Skin:  denies lesions or pruritis   Neuro:  denies weakness, focal neurologic symptoms    Vitals:    23 0948   BP: 124/78   BP Location: Right arm   Patient Position: Sitting   Pulse: 73   Temp: 97.8 °F (36.6 °C)   TempSrc: Temporal   Weight: 56.6 kg (124 lb 12.8 oz)          PHYSICAL EXAM   GENERAL: Not in acute distress, AAOx3, pleasant  CARDIO: regular rate and rhythm  PULM: symmetric chest rise, speaking in complete sentences without difficulty  NEURO: awake, alert and oriented to person, place, and time  ABDOMINAL: No fundal tenderness, no rebound or guarding, gravid  EXTREMITIES: no bilateral lower extremity edema/tenderness  SKIN: Warm, well-perfused      ULTRASOUND   Please view full ultrasound note on Imaging tab in ViewPoint.  Cephalic presentation.  Right fundal placenta.  GLORIA 17 cm, which is normal.   BPP 8/8  EFW 2445 g (6%, AC 6%)  UA dopplers mostly normal without absent or reversed flow ( elevated)       ASSESSMENT/COUNSELIN y.o.   at 37 4/7 weeks gestation (Estimated Date of Delivery: 23).    -Pregnancy  [ X ] stable  [   ] improving [  ] worsening    Diagnoses and all orders for this visit:    1. IUGR (intrauterine growth restriction) affecting care of mother, third trimester, fetus 1 (Primary)           FGR, stable intermittently elevated dopplers.    Normal cell free DNA     I think the most likely cause of this is placental insufficiency. Timing of delivery is guided by the results  testing of fetal well being (ultrasound BPP, FHR tracing findings) in addition to Doppler findings (umbilical artery, MCA, and ductus venosus).  We have had elevated dopplers intermittently with BPP of 8/8.  She is doing well and was scheduled through the rest of her pregnancy.  Dopplers normal today.      Delivery tomorrow.  Pt congratulated.        Summary of Plan  -Delivery tomorrow--pt congratulated.       Follow-up: No follow up scheduled--happy to see at request of primary OB    Thank you for the consult and opportunity to care for this patient.  Please feel free to reach out with any questions or concerns.      I spent 15 minutes caring for this patient on this date of service. This time includes time spent by me in the following activities: preparing  for the visit, reviewing tests, obtaining and/or reviewing a separately obtained history, performing a medically appropriate examination and/or evaluation, counseling and educating the patient/family/caregiver and independently interpreting results and communicating that information with the patient/family/caregiver with greater than 50% spent in counseling and coordination of care.       I spent 4 minutes on the separately reported service of US imaging not included in the time used to support the E/M service also reported today.      Adela Godinez MD FACOG  Maternal Fetal Medicine-[facility]  Office: [phone]  [email or web address]

## 2023-06-05 ENCOUNTER — OFFICE VISIT (OUTPATIENT)
Dept: OBSTETRICS AND GYNECOLOGY | Facility: CLINIC | Age: 18
End: 2023-06-05
Payer: COMMERCIAL

## 2023-06-05 ENCOUNTER — HOSPITAL ENCOUNTER (OUTPATIENT)
Dept: ULTRASOUND IMAGING | Facility: HOSPITAL | Age: 18
Discharge: HOME | End: 2023-06-05
Admitting: OBSTETRICS & GYNECOLOGY
Payer: COMMERCIAL

## 2023-06-05 VITALS
SYSTOLIC BLOOD PRESSURE: 124 MMHG | WEIGHT: 124.8 LBS | HEART RATE: 73 BPM | BODY MASS INDEX: 22.83 KG/M2 | DIASTOLIC BLOOD PRESSURE: 78 MMHG | TEMPERATURE: 97.8 F

## 2023-06-05 DIAGNOSIS — O36.5931 IUGR (INTRAUTERINE GROWTH RESTRICTION) AFFECTING CARE OF MOTHER, THIRD TRIMESTER, FETUS 1: Primary | ICD-10-CM

## 2023-06-05 PROCEDURE — 76819 FETAL BIOPHYS PROFIL W/O NST: CPT | Performed by: OBSTETRICS & GYNECOLOGY

## 2023-06-05 PROCEDURE — 76820 UMBILICAL ARTERY ECHO: CPT | Performed by: OBSTETRICS & GYNECOLOGY

## 2023-06-05 PROCEDURE — 76816 OB US FOLLOW-UP PER FETUS: CPT | Performed by: OBSTETRICS & GYNECOLOGY

## 2023-06-05 PROCEDURE — 76819 FETAL BIOPHYS PROFIL W/O NST: CPT

## 2023-06-05 PROCEDURE — 76820 UMBILICAL ARTERY ECHO: CPT

## 2023-06-05 PROCEDURE — 76816 OB US FOLLOW-UP PER FETUS: CPT

## 2023-06-05 PROCEDURE — 99213 OFFICE O/P EST LOW 20 MIN: CPT | Performed by: OBSTETRICS & GYNECOLOGY

## 2023-06-05 NOTE — LETTER
2023     Steven Pruitt MD  [address] [place] [address]  [address]  [place] [address]    Patient: Venice Lao   YOB: 2005   Date of Visit: 2023       Dear Steven Pruitt MD,    Thank you for referring Venice Lao to me for evaluation. Below is a copy of my consult note.    If you have questions, please do not hesitate to call me. I look forward to following Venice along with you.         Sincerely,        Adela Godinez MD      MATERNAL FETAL MEDICINE Consult Note    Dear Dr Steven Pruitt MD:    Thank you for your kind referral of [place] Lao.  As you know, she is a 18 y.o.   at 37  4/7 weeks gestation (Estimated Date of Delivery: 23). This is a consult.     Her antepartum course is complicated by:  FGR,  intermittently elevated dopplers    Aneuploidy Screening: low risk male    HPI: Today, she denies headache, blurry vision, RUQ pain. No vaginal bleeding, no contractions.     Review of History:  History reviewed. No pertinent past medical history.  History reviewed. No pertinent surgical history.    Social History     Socioeconomic History   • Marital status: Single   Tobacco Use   • Smoking status: Never     Passive exposure: Never   • Smokeless tobacco: Never   Vaping Use   • Vaping Use: Never used   Substance and Sexual Activity   • Alcohol use: Never   • Drug use: Never   • Sexual activity: Yes     Partners: Male     Birth control/protection: None     History reviewed. No pertinent family history.   No Known Allergies   Current Outpatient Medications on File Prior to Visit   Medication Sig Dispense Refill   • prenatal vitamin (prenatal, CLASSIC, vitamin) tablet Take  by mouth Daily.       No current facility-administered medications on file prior to visit.        Past obstetric, gynecological, medical, surgical, family and social history reviewed.  Relevant lab work and imaging reviewed.    Review of systems  Constitutional:  denies fever, chills,  malaise.   ENT/Mouth:  denies sore throat, tinnitis  Eyes: denies vision changes/pain  CV:  denies chest pain  Respiratory:  denies cough/SOB  GI:  denies N/V, diarrhea, abdominal pain.    :   denies dysuria  Skin:  denies lesions or pruritis   Neuro:  denies weakness, focal neurologic symptoms    Vitals:    23 0948   BP: 124/78   BP Location: Right arm   Patient Position: Sitting   Pulse: 73   Temp: 97.8 °F (36.6 °C)   TempSrc: Temporal   Weight: 56.6 kg (124 lb 12.8 oz)         PHYSICAL EXAM   GENERAL: Not in acute distress, AAOx3, pleasant  CARDIO: regular rate and rhythm  PULM: symmetric chest rise, speaking in complete sentences without difficulty  NEURO: awake, alert and oriented to person, place, and time  ABDOMINAL: No fundal tenderness, no rebound or guarding, gravid  EXTREMITIES: no bilateral lower extremity edema/tenderness  SKIN: Warm, well-perfused      ULTRASOUND   Please view full ultrasound note on Imaging tab in ViewPoint.  Cephalic presentation.  Right fundal placenta.  GLORIA 17 cm, which is normal.   BPP 8/8  EFW 2445 g (6%, AC 6%)  UA dopplers mostly normal without absent or reversed flow ( elevated)       ASSESSMENT/COUNSELIN y.o.   at 37 4/7 weeks gestation (Estimated Date of Delivery: 23).    -Pregnancy  [ X ] stable  [   ] improving [  ] worsening    Diagnoses and all orders for this visit:    1. IUGR (intrauterine growth restriction) affecting care of mother, third trimester, fetus 1 (Primary)           FGR, stable intermittently elevated dopplers.    Normal cell free DNA     I think the most likely cause of this is placental insufficiency. Timing of delivery is guided by the results  testing of fetal well being (ultrasound BPP, FHR tracing findings) in addition to Doppler findings (umbilical artery, MCA, and ductus venosus).  We have had elevated dopplers intermittently with BPP of 8/8.  She is doing well and was scheduled through the rest of her pregnancy.   Dopplers normal today.      Delivery tomorrow.  Pt congratulated.        Summary of Plan  -Delivery tomorrow--pt congratulated.       Follow-up: No follow up scheduled--happy to see at request of primary OB    Thank you for the consult and opportunity to care for this patient.  Please feel free to reach out with any questions or concerns.      I spent 15 minutes caring for this patient on this date of service. This time includes time spent by me in the following activities: preparing for the visit, reviewing tests, obtaining and/or reviewing a separately obtained history, performing a medically appropriate examination and/or evaluation, counseling and educating the patient/family/caregiver and independently interpreting results and communicating that information with the patient/family/caregiver with greater than 50% spent in counseling and coordination of care.       I spent 4 minutes on the separately reported service of US imaging not included in the time used to support the E/M service also reported today.      Adela Godinez MD FACOG  Maternal Fetal Medicine-[facility]  Office: [phone]  [email or web address]

## 2023-06-05 NOTE — PROGRESS NOTES
Pt reports that she is doing well and denies vaginal bleeding or LOF at this time. Continues to report irregular abdominal tightness. Reports active fetal movement. Reviewed when to call OB office or present to L&D for evaluation with symptoms such as decreased fetal movement, vaginal bleeding, LOF or ctxs. Pt verbalized understanding. Denies HA, visual changes or epigastric pain. Denies any additional complaints at time of appointment. Induction scheduled for 6/6!    Vitals:    06/05/23 0948   BP: 124/78   Pulse: 73   Temp: 97.8 °F (36.6 °C)

## 2023-06-06 ENCOUNTER — HOSPITAL ENCOUNTER (INPATIENT)
Facility: HOSPITAL | Age: 18
LOS: 3 days | Discharge: HOME | End: 2023-06-09
Attending: OBSTETRICS & GYNECOLOGY | Admitting: OBSTETRICS & GYNECOLOGY
Payer: COMMERCIAL

## 2023-06-06 ENCOUNTER — ANESTHESIA (OUTPATIENT)
Dept: LABOR AND DELIVERY | Facility: HOSPITAL | Age: 18
End: 2023-06-06
Payer: COMMERCIAL

## 2023-06-06 ENCOUNTER — ANESTHESIA EVENT (OUTPATIENT)
Dept: LABOR AND DELIVERY | Facility: HOSPITAL | Age: 18
End: 2023-06-06
Payer: COMMERCIAL

## 2023-06-06 ENCOUNTER — HOSPITAL ENCOUNTER (INPATIENT)
Dept: LABOR AND DELIVERY | Facility: HOSPITAL | Age: 18
Discharge: HOME | End: 2023-06-06
Payer: COMMERCIAL

## 2023-06-06 PROBLEM — Z34.90 PREGNANCY: Status: ACTIVE | Noted: 2023-06-06

## 2023-06-06 LAB
ABO GROUP BLD: NORMAL
AMPHET+METHAMPHET UR QL: NEGATIVE
BARBITURATES UR QL SCN: NEGATIVE
BASOPHILS # BLD AUTO: 0.03 10*3/MM3 (ref 0–0.2)
BASOPHILS NFR BLD AUTO: 0.5 % (ref 0–1.5)
BENZODIAZ UR QL SCN: NEGATIVE
BLD GP AB SCN SERPL QL: NEGATIVE
CANNABINOIDS SERPL QL: NEGATIVE
COCAINE UR QL: NEGATIVE
DEPRECATED RDW RBC AUTO: 39.8 FL (ref 37–54)
EOSINOPHIL # BLD AUTO: 0.04 10*3/MM3 (ref 0–0.4)
EOSINOPHIL NFR BLD AUTO: 0.6 % (ref 0.3–6.2)
ERYTHROCYTE [DISTWIDTH] IN BLOOD BY AUTOMATED COUNT: 12.8 % (ref 12.3–15.4)
FENTANYL UR-MCNC: NEGATIVE NG/ML
HCT VFR BLD AUTO: 34.6 % (ref 34–46.6)
HGB BLD-MCNC: 11.7 G/DL (ref 12–15.9)
IMM GRANULOCYTES # BLD AUTO: 0.05 10*3/MM3 (ref 0–0.05)
IMM GRANULOCYTES NFR BLD AUTO: 0.8 % (ref 0–0.5)
LYMPHOCYTES # BLD AUTO: 1.64 10*3/MM3 (ref 0.7–3.1)
LYMPHOCYTES NFR BLD AUTO: 26.6 % (ref 19.6–45.3)
MCH RBC QN AUTO: 28.9 PG (ref 26.6–33)
MCHC RBC AUTO-ENTMCNC: 33.8 G/DL (ref 31.5–35.7)
MCV RBC AUTO: 85.4 FL (ref 79–97)
METHADONE UR QL SCN: NEGATIVE
MONOCYTES # BLD AUTO: 0.58 10*3/MM3 (ref 0.1–0.9)
MONOCYTES NFR BLD AUTO: 9.4 % (ref 5–12)
NEUTROPHILS NFR BLD AUTO: 3.83 10*3/MM3 (ref 1.7–7)
NEUTROPHILS NFR BLD AUTO: 62.1 % (ref 42.7–76)
NRBC BLD AUTO-RTO: 0.2 /100 WBC (ref 0–0.2)
OPIATES UR QL: NEGATIVE
OXYCODONE UR QL SCN: NEGATIVE
PLATELET # BLD AUTO: 241 10*3/MM3 (ref 140–450)
PMV BLD AUTO: 8.6 FL (ref 6–12)
RBC # BLD AUTO: 4.05 10*6/MM3 (ref 3.77–5.28)
RH BLD: POSITIVE
T&S EXPIRATION DATE: NORMAL
WBC NRBC COR # BLD: 6.17 10*3/MM3 (ref 3.4–10.8)

## 2023-06-06 PROCEDURE — 86850 RBC ANTIBODY SCREEN: CPT | Performed by: OBSTETRICS & GYNECOLOGY

## 2023-06-06 PROCEDURE — 80307 DRUG TEST PRSMV CHEM ANLYZR: CPT | Performed by: OBSTETRICS & GYNECOLOGY

## 2023-06-06 PROCEDURE — 85025 COMPLETE CBC W/AUTO DIFF WBC: CPT | Performed by: OBSTETRICS & GYNECOLOGY

## 2023-06-06 PROCEDURE — S0260 H&P FOR SURGERY: HCPCS | Performed by: OBSTETRICS & GYNECOLOGY

## 2023-06-06 PROCEDURE — 86901 BLOOD TYPING SEROLOGIC RH(D): CPT | Performed by: OBSTETRICS & GYNECOLOGY

## 2023-06-06 PROCEDURE — 86900 BLOOD TYPING SEROLOGIC ABO: CPT | Performed by: OBSTETRICS & GYNECOLOGY

## 2023-06-06 RX ORDER — LIDOCAINE HYDROCHLORIDE 10 MG/ML
5 INJECTION, SOLUTION EPIDURAL; INFILTRATION; INTRACAUDAL; PERINEURAL AS NEEDED
Status: DISCONTINUED | OUTPATIENT
Start: 2023-06-06 | End: 2023-06-07 | Stop reason: HOSPADM

## 2023-06-06 RX ORDER — MAGNESIUM CARB/ALUMINUM HYDROX 105-160MG
30 TABLET,CHEWABLE ORAL ONCE
Status: DISCONTINUED | OUTPATIENT
Start: 2023-06-06 | End: 2023-06-07 | Stop reason: HOSPADM

## 2023-06-06 RX ORDER — FENTANYL CIT 0.2 MG/100ML-ROPIV 0.2%-NACL 0.9% EPIDURAL INJ 2/0.2 MCG/ML-%
10 SOLUTION INJECTION CONTINUOUS
Status: DISCONTINUED | OUTPATIENT
Start: 2023-06-06 | End: 2023-06-07

## 2023-06-06 RX ORDER — SODIUM CHLORIDE, SODIUM LACTATE, POTASSIUM CHLORIDE, CALCIUM CHLORIDE 600; 310; 30; 20 MG/100ML; MG/100ML; MG/100ML; MG/100ML
125 INJECTION, SOLUTION INTRAVENOUS CONTINUOUS
Status: DISCONTINUED | OUTPATIENT
Start: 2023-06-06 | End: 2023-06-07

## 2023-06-06 RX ORDER — SODIUM CHLORIDE 0.9 % (FLUSH) 0.9 %
10 SYRINGE (ML) INJECTION AS NEEDED
Status: DISCONTINUED | OUTPATIENT
Start: 2023-06-06 | End: 2023-06-07 | Stop reason: HOSPADM

## 2023-06-06 RX ORDER — SODIUM CHLORIDE 0.9 % (FLUSH) 0.9 %
10 SYRINGE (ML) INJECTION EVERY 12 HOURS SCHEDULED
Status: DISCONTINUED | OUTPATIENT
Start: 2023-06-06 | End: 2023-06-07 | Stop reason: HOSPADM

## 2023-06-06 RX ORDER — FENTANYL CITRATE 50 UG/ML
50 INJECTION, SOLUTION INTRAMUSCULAR; INTRAVENOUS
Status: DISCONTINUED | OUTPATIENT
Start: 2023-06-06 | End: 2023-06-07 | Stop reason: HOSPADM

## 2023-06-06 RX ORDER — EPHEDRINE SULFATE 50 MG/ML
5 INJECTION, SOLUTION INTRAVENOUS AS NEEDED
Status: DISCONTINUED | OUTPATIENT
Start: 2023-06-06 | End: 2023-06-07 | Stop reason: HOSPADM

## 2023-06-06 RX ORDER — MISOPROSTOL 100 MCG
25 TABLET ORAL EVERY 4 HOURS
Status: COMPLETED | OUTPATIENT
Start: 2023-06-06 | End: 2023-06-06

## 2023-06-06 RX ORDER — ONDANSETRON 2 MG/ML
4 INJECTION INTRAMUSCULAR; INTRAVENOUS ONCE AS NEEDED
Status: DISCONTINUED | OUTPATIENT
Start: 2023-06-06 | End: 2023-06-07 | Stop reason: HOSPADM

## 2023-06-06 RX ORDER — DIPHENHYDRAMINE HYDROCHLORIDE 50 MG/ML
12.5 INJECTION INTRAMUSCULAR; INTRAVENOUS EVERY 8 HOURS PRN
Status: DISCONTINUED | OUTPATIENT
Start: 2023-06-06 | End: 2023-06-07 | Stop reason: HOSPADM

## 2023-06-06 RX ORDER — FAMOTIDINE 10 MG/ML
20 INJECTION, SOLUTION INTRAVENOUS ONCE AS NEEDED
Status: DISCONTINUED | OUTPATIENT
Start: 2023-06-06 | End: 2023-06-07 | Stop reason: HOSPADM

## 2023-06-06 RX ADMIN — Medication 25 MCG: at 17:55

## 2023-06-06 RX ADMIN — Medication 25 MCG: at 22:35

## 2023-06-06 RX ADMIN — SODIUM CHLORIDE, POTASSIUM CHLORIDE, SODIUM LACTATE AND CALCIUM CHLORIDE 125 ML/HR: 600; 310; 30; 20 INJECTION, SOLUTION INTRAVENOUS at 12:57

## 2023-06-06 RX ADMIN — SODIUM CHLORIDE, POTASSIUM CHLORIDE, SODIUM LACTATE AND CALCIUM CHLORIDE 125 ML/HR: 600; 310; 30; 20 INJECTION, SOLUTION INTRAVENOUS at 22:38

## 2023-06-06 RX ADMIN — Medication 25 MCG: at 13:50

## 2023-06-06 NOTE — PLAN OF CARE
Goal Outcome Evaluation:  Plan of Care Reviewed With: patient        Progress: no change  Outcome Evaluation: IOL for IUGR. Cytotec for cervical ripening. VSS. Denies pain. Plans for vaginal delivery.

## 2023-06-06 NOTE — H&P
H&P Note    Patient Identification:  Name: Venice Lao  Age: 18 y.o.  Sex: female  :  2005  MRN: [id number]                       Chief Complaint: IUGR    History of Present Illness:   18-year-old  2 para 0 presents at 37-5/7 weeks for induction of labor due to IUGR.  The patient has been followed for this.  Biophysical profile most recently was 8 out of 8 with reassuring Dopplers.  The patient was evaluated by maternal-fetal medicine and delivery between 37 and 38 weeks was recommended.  The patient was counseled regarding the benefits and risks of this and has elected to proceed.  Because her cervix is unfavorable, she will require cervical ripening prior to induction.  Group B strep status is negative.    Problem List:  [unfilled]  Past Medical History:  History reviewed. No pertinent past medical history.  Past Surgical History:  History reviewed. No pertinent surgical history.   Home Meds:  Medications Prior to Admission   Medication Sig Dispense Refill Last Dose    prenatal vitamin (prenatal, CLASSIC, vitamin) tablet Take  by mouth Daily.   2023     Current Meds:   [unfilled]  Allergies:  No Known Allergies  Immunizations:    There is no immunization history on file for this patient.  Social History:   Social History     Tobacco Use    Smoking status: Never     Passive exposure: Never    Smokeless tobacco: Never   Substance Use Topics    Alcohol use: Never      Family History:  History reviewed. No pertinent family history.     Review of Systems  A comprehensive review of systems was negative.    Objective:  tMax 24 hrs: Temp (24hrs), Av °F (36.7 °C), Min:98 °F (36.7 °C), Max:98 °F (36.7 °C)    Vitals Ranges:   Temp:  [98 °F (36.7 °C)] 98 °F (36.7 °C)  Heart Rate:  [73-95] 73  Resp:  [16] 16  BP: (115-136)/(70-91) 115/70  Intake and Output Last 3 Shifts:   No intake/output data recorded.    Exam:     General Appearance:    Alert, cooperative, no distress, appears stated age   Head:     Normocephalic, without obvious abnormality, atraumatic   Back:     Symmetric, no curvature, ROM normal, no CVA tenderness   Lungs:     Clear to auscultation bilaterally, respirations unlabored   Chest Wall:    No tenderness or deformity    Heart:    Regular rate and rhythm, S1 and S2 normal, no murmur, rub   or gallop       Abdomen:   Soft, gravid.  There is no epigastric tenderness.  No fundal tenderness.  No suprapubic tenderness.   Genitalia:  The cervix is posterior and 0.5 cm dilated per RN exam today   Rectal:  Not examined today   Extremities: Equal in size   Skin:   Skin color, texture, turgor normal, no rashes or lesions   Lymph nodes:   Cervical, supraclavicular, and axillary nodes normal       Data Review:    Lab Results (last 24 hours)       Procedure Component Value Units Date/Time    URINE DRUG SCREEN PLUS BUPRENORPHINE - [[id number]] Collected: 06/06/23 1446     Updated: 06/06/23 1519    Narrative:      The following orders were created for panel order URINE DRUG SCREEN PLUS BUPRENORPHINE -.  Procedure                               Abnormality         Status                     ---------                               -----------         ------                     Urine Drug Screen - Urin...[[id number]]  Normal              Final result               Buprenorphine Screen Uri...[[id number]]                      In process                   Please view results for these tests on the individual orders.    Urine Drug Screen - Urine, Clean Catch [[id number]]  (Normal) Collected: 06/06/23 1446    Specimen: Urine, Clean Catch Updated: 06/06/23 1519     Amphet/Methamphet, Screen Negative     Barbiturates Screen, Urine Negative     Benzodiazepine Screen, Urine Negative     Cocaine Screen, Urine Negative     Opiate Screen Negative     THC, Screen, Urine Negative     Methadone Screen, Urine Negative     Oxycodone Screen, Urine Negative     Fentanyl, Urine Negative    Narrative:      Negative Thresholds Per Drugs  Screened:    Amphetamines                 500 ng/ml  Barbiturates                 200 ng/ml  Benzodiazepines              100 ng/ml  Cocaine                      300 ng/ml  Methadone                    300 ng/ml  Opiates                      300 ng/ml  Oxycodone                    100 ng/ml  THC                           50 ng/ml  Fentanyl                       5 ng/ml      The Normal Value for all drugs tested is negative. This report includes final unconfirmed screening results to be used for medical treatment purposes only. Unconfirmed results must not be used for non-medical purposes such as employment or legal testing. Clinical consideration should be applied to any drug of abuse test, particularly when unconfirmed results are used.            Buprenorphine Screen Urine - Urine, Clean Catch [[id number]] Collected: 06/06/23 1446    Specimen: Urine, Clean Catch Updated: 06/06/23 1451    CBC & Differential [[id number]]  (Abnormal) Collected: 06/06/23 1257    Specimen: Blood Updated: 06/06/23 1320    Narrative:      The following orders were created for panel order CBC & Differential.  Procedure                               Abnormality         Status                     ---------                               -----------         ------                     CBC Auto Differential[[id number]]        Abnormal            Final result                 Please view results for these tests on the individual orders.    CBC Auto Differential [[id number]]  (Abnormal) Collected: 06/06/23 1257    Specimen: Blood Updated: 06/06/23 1320     WBC 6.17 10*3/mm3      RBC 4.05 10*6/mm3      Hemoglobin 11.7 g/dL      Hematocrit 34.6 %      MCV 85.4 fL      MCH 28.9 pg      MCHC 33.8 g/dL      RDW 12.8 %      RDW-SD 39.8 fl      MPV 8.6 fL      Platelets 241 10*3/mm3      Neutrophil % 62.1 %      Lymphocyte % 26.6 %      Monocyte % 9.4 %      Eosinophil % 0.6 %      Basophil % 0.5 %      Immature Grans % 0.8 %      Neutrophils, Absolute 3.83  10*3/mm3      Lymphocytes, Absolute 1.64 10*3/mm3      Monocytes, Absolute 0.58 10*3/mm3      Eosinophils, Absolute 0.04 10*3/mm3      Basophils, Absolute 0.03 10*3/mm3      Immature Grans, Absolute 0.05 10*3/mm3      nRBC 0.2 /100 WBC           Assessment:    Pregnancy      1.  Intrauterine pregnancy at 37-5/7 weeks  2.  Intrauterine growth restriction  3.  Group B strep status is negative  4.  Cervix is 0.5 cm dilated    Plan:  Induction of labor.  This will be preceded by cervical ripening using Cytotec.  The patient has been counseled regarding the benefits, risks and alternatives to this approach and her questions have been answered.  The off label nature of using Cytotec has also been discussed.  The patient's questions have been answered and she would like to proceed.  Fetal heart tones are reactive.  The first dose of Cytotec has been given.    Steven Pruitt MD  6/6/2023

## 2023-06-07 LAB — BUPRENORPHINE UR QL: NEGATIVE NG/ML

## 2023-06-07 PROCEDURE — 88307 TISSUE EXAM BY PATHOLOGIST: CPT

## 2023-06-07 PROCEDURE — 0HQ9XZZ REPAIR PERINEUM SKIN, EXTERNAL APPROACH: ICD-10-PCS | Performed by: OBSTETRICS & GYNECOLOGY

## 2023-06-07 PROCEDURE — 10H07YZ INSERTION OF OTHER DEVICE INTO PRODUCTS OF CONCEPTION, VIA NATURAL OR ARTIFICIAL OPENING: ICD-10-PCS | Performed by: OBSTETRICS & GYNECOLOGY

## 2023-06-07 PROCEDURE — 59409 OBSTETRICAL CARE: CPT | Performed by: OBSTETRICS & GYNECOLOGY

## 2023-06-07 PROCEDURE — C1755 CATHETER, INTRASPINAL: HCPCS | Performed by: ANESTHESIOLOGY

## 2023-06-07 PROCEDURE — 25010000002 FENTANYL CITRATE (PF) 50 MCG/ML SOLUTION: Performed by: OBSTETRICS & GYNECOLOGY

## 2023-06-07 PROCEDURE — 10907ZC DRAINAGE OF AMNIOTIC FLUID, THERAPEUTIC FROM PRODUCTS OF CONCEPTION, VIA NATURAL OR ARTIFICIAL OPENING: ICD-10-PCS | Performed by: OBSTETRICS & GYNECOLOGY

## 2023-06-07 RX ORDER — PRENATAL VIT/IRON FUM/FOLIC AC 27MG-0.8MG
1 TABLET ORAL DAILY
Status: DISCONTINUED | OUTPATIENT
Start: 2023-06-07 | End: 2023-06-09 | Stop reason: HOSPADM

## 2023-06-07 RX ORDER — OXYTOCIN/0.9 % SODIUM CHLORIDE 30/500 ML
2-20 PLASTIC BAG, INJECTION (ML) INTRAVENOUS
Status: DISCONTINUED | OUTPATIENT
Start: 2023-06-07 | End: 2023-06-07

## 2023-06-07 RX ORDER — FENTANYL CITRATE 50 UG/ML
50 INJECTION, SOLUTION INTRAMUSCULAR; INTRAVENOUS
Status: DISCONTINUED | OUTPATIENT
Start: 2023-06-07 | End: 2023-06-07

## 2023-06-07 RX ORDER — OXYTOCIN/0.9 % SODIUM CHLORIDE 30/500 ML
125 PLASTIC BAG, INJECTION (ML) INTRAVENOUS CONTINUOUS PRN
Status: COMPLETED | OUTPATIENT
Start: 2023-06-07 | End: 2023-06-07

## 2023-06-07 RX ORDER — OXYTOCIN/0.9 % SODIUM CHLORIDE 30/500 ML
125 PLASTIC BAG, INJECTION (ML) INTRAVENOUS CONTINUOUS PRN
Status: DISCONTINUED | OUTPATIENT
Start: 2023-06-07 | End: 2023-06-09 | Stop reason: HOSPADM

## 2023-06-07 RX ORDER — ACETAMINOPHEN 325 MG/1
650 TABLET ORAL EVERY 6 HOURS PRN
Status: DISCONTINUED | OUTPATIENT
Start: 2023-06-07 | End: 2023-06-09 | Stop reason: HOSPADM

## 2023-06-07 RX ORDER — METHYLERGONOVINE MALEATE 0.2 MG/ML
200 INJECTION INTRAVENOUS ONCE AS NEEDED
Status: DISCONTINUED | OUTPATIENT
Start: 2023-06-07 | End: 2023-06-09 | Stop reason: HOSPADM

## 2023-06-07 RX ORDER — HYDROCODONE BITARTRATE AND ACETAMINOPHEN 5; 325 MG/1; MG/1
1 TABLET ORAL EVERY 4 HOURS PRN
Status: DISCONTINUED | OUTPATIENT
Start: 2023-06-07 | End: 2023-06-09 | Stop reason: HOSPADM

## 2023-06-07 RX ORDER — HYDROCODONE BITARTRATE AND ACETAMINOPHEN 10; 325 MG/1; MG/1
1 TABLET ORAL EVERY 4 HOURS PRN
Status: DISCONTINUED | OUTPATIENT
Start: 2023-06-07 | End: 2023-06-09 | Stop reason: HOSPADM

## 2023-06-07 RX ORDER — PHYTONADIONE 1 MG/.5ML
INJECTION, EMULSION INTRAMUSCULAR; INTRAVENOUS; SUBCUTANEOUS
Status: ACTIVE
Start: 2023-06-07 | End: 2023-06-08

## 2023-06-07 RX ORDER — BISACODYL 10 MG
10 SUPPOSITORY, RECTAL RECTAL DAILY PRN
Status: DISCONTINUED | OUTPATIENT
Start: 2023-06-08 | End: 2023-06-09 | Stop reason: HOSPADM

## 2023-06-07 RX ORDER — HYDROXYZINE 50 MG/1
50 TABLET, FILM COATED ORAL NIGHTLY PRN
Status: DISCONTINUED | OUTPATIENT
Start: 2023-06-07 | End: 2023-06-09 | Stop reason: HOSPADM

## 2023-06-07 RX ORDER — DOCUSATE SODIUM 100 MG/1
100 CAPSULE, LIQUID FILLED ORAL 2 TIMES DAILY
Status: DISCONTINUED | OUTPATIENT
Start: 2023-06-07 | End: 2023-06-09 | Stop reason: HOSPADM

## 2023-06-07 RX ORDER — LIDOCAINE HYDROCHLORIDE AND EPINEPHRINE 15; 5 MG/ML; UG/ML
INJECTION, SOLUTION EPIDURAL AS NEEDED
Status: DISCONTINUED | OUTPATIENT
Start: 2023-06-07 | End: 2023-06-07 | Stop reason: SURG

## 2023-06-07 RX ORDER — CARBOPROST TROMETHAMINE 250 UG/ML
250 INJECTION, SOLUTION INTRAMUSCULAR
Status: DISCONTINUED | OUTPATIENT
Start: 2023-06-07 | End: 2023-06-07 | Stop reason: HOSPADM

## 2023-06-07 RX ORDER — HYDROCORTISONE 25 MG/G
1 CREAM TOPICAL AS NEEDED
Status: DISCONTINUED | OUTPATIENT
Start: 2023-06-07 | End: 2023-06-09 | Stop reason: HOSPADM

## 2023-06-07 RX ORDER — SODIUM CHLORIDE 0.9 % (FLUSH) 0.9 %
1-10 SYRINGE (ML) INJECTION AS NEEDED
Status: DISCONTINUED | OUTPATIENT
Start: 2023-06-07 | End: 2023-06-09 | Stop reason: HOSPADM

## 2023-06-07 RX ORDER — MISOPROSTOL 200 UG/1
800 TABLET ORAL ONCE AS NEEDED
Status: DISCONTINUED | OUTPATIENT
Start: 2023-06-07 | End: 2023-06-07 | Stop reason: HOSPADM

## 2023-06-07 RX ORDER — IBUPROFEN 600 MG/1
600 TABLET ORAL EVERY 6 HOURS PRN
Status: DISCONTINUED | OUTPATIENT
Start: 2023-06-07 | End: 2023-06-09 | Stop reason: HOSPADM

## 2023-06-07 RX ORDER — ERYTHROMYCIN 5 MG/G
OINTMENT OPHTHALMIC
Status: ACTIVE
Start: 2023-06-07 | End: 2023-06-08

## 2023-06-07 RX ORDER — ONDANSETRON 4 MG/1
4 TABLET, FILM COATED ORAL EVERY 8 HOURS PRN
Status: DISCONTINUED | OUTPATIENT
Start: 2023-06-07 | End: 2023-06-09 | Stop reason: HOSPADM

## 2023-06-07 RX ORDER — METHYLERGONOVINE MALEATE 0.2 MG/ML
200 INJECTION INTRAVENOUS ONCE AS NEEDED
Status: DISCONTINUED | OUTPATIENT
Start: 2023-06-07 | End: 2023-06-07 | Stop reason: HOSPADM

## 2023-06-07 RX ORDER — OXYTOCIN/0.9 % SODIUM CHLORIDE 30/500 ML
250 PLASTIC BAG, INJECTION (ML) INTRAVENOUS CONTINUOUS
Status: DISPENSED | OUTPATIENT
Start: 2023-06-07 | End: 2023-06-07

## 2023-06-07 RX ORDER — OXYTOCIN/0.9 % SODIUM CHLORIDE 30/500 ML
999 PLASTIC BAG, INJECTION (ML) INTRAVENOUS ONCE
Status: COMPLETED | OUTPATIENT
Start: 2023-06-07 | End: 2023-06-07

## 2023-06-07 RX ADMIN — LIDOCAINE HYDROCHLORIDE AND EPINEPHRINE 3 ML: 15; 5 INJECTION, SOLUTION EPIDURAL at 05:47

## 2023-06-07 RX ADMIN — Medication 2 MILLI-UNITS/MIN: at 04:45

## 2023-06-07 RX ADMIN — HYDROCODONE BITARTRATE AND ACETAMINOPHEN 1 TABLET: 5; 325 TABLET ORAL at 15:02

## 2023-06-07 RX ADMIN — IBUPROFEN 600 MG: 600 TABLET ORAL at 21:12

## 2023-06-07 RX ADMIN — Medication 10 ML/HR: at 05:47

## 2023-06-07 RX ADMIN — PRENATAL VITAMINS-IRON FUMARATE 27 MG IRON-FOLIC ACID 0.8 MG TABLET 1 TABLET: at 17:44

## 2023-06-07 RX ADMIN — Medication 999 ML/HR: at 13:47

## 2023-06-07 RX ADMIN — IBUPROFEN 600 MG: 600 TABLET ORAL at 15:02

## 2023-06-07 RX ADMIN — DOCUSATE SODIUM 100 MG: 100 CAPSULE, LIQUID FILLED ORAL at 21:12

## 2023-06-07 RX ADMIN — SODIUM CHLORIDE, POTASSIUM CHLORIDE, SODIUM LACTATE AND CALCIUM CHLORIDE 500 ML: 600; 310; 30; 20 INJECTION, SOLUTION INTRAVENOUS at 07:26

## 2023-06-07 RX ADMIN — Medication 125 ML/HR: at 15:21

## 2023-06-07 RX ADMIN — SODIUM CHLORIDE, POTASSIUM CHLORIDE, SODIUM LACTATE AND CALCIUM CHLORIDE 999 ML/HR: 600; 310; 30; 20 INJECTION, SOLUTION INTRAVENOUS at 09:14

## 2023-06-07 RX ADMIN — FENTANYL CITRATE 50 MCG: 50 INJECTION, SOLUTION INTRAMUSCULAR; INTRAVENOUS at 04:38

## 2023-06-07 NOTE — L&D DELIVERY NOTE
Delivery Note    Obstetrician:   Steven Pruitt MD      Pre-Delivery Diagnosis: 1.  Intrauterine pregnancy at 37-6/7 weeks  2.  Intrauterine growth restriction    Post-Delivery Diagnosis: Same    Procedure: Spontaneous vaginal delivery     Episiotomy or Incision: none  18-year-old  2 para 0 presented at 37-5/7 weeks for an induction of labor.  This was recommended by maternal-fetal medicine due to intrauterine growth restriction.  Group B strep status was negative.  Fetal heart tones were reactive at presentation.      The cervix was 0.5 cm dilated.  Cervical ripening was undertaken with Cytotec for 3 doses.  After this, the cervix was 2 cm dilated.  Induction of labor was undertaken using Pitocin.  An epidural catheter was placed for pain control.  Amniotomy was performed at 4 cm with return of clear fluid.      The patient experienced an episode of variable decelerations.  For this reason, an intrauterine pressure catheter was placed and an amnioinfusion was performed.  This brought about return of a category 1 fetal heart rate monitor strip.  The patient progressed along an adequate labor curve to complete effacement of dilatation as well as +2 station of the presenting part.  She labor down and then began to push.      The patient pushed to spontaneous vaginal delivery of the fetal head from left occiput anterior presentation.  The mouth and naris were suctioned with a bulb while on the perineum.  Shoulders were delivered easily, followed by the remainder of the infant.  After delay of 30 seconds, the cord was doubly clamped and divided.  The infant was then evaluated by the delivery team who assigned Apgars of 9 and 9.  This is a vigorous male .      The perineum was inspected.  There was a small first-degree laceration which was repaired with 3-0 Monocryl.  There was a small periurethral laceration which was not bleeding.  I elected not to repair it.      The placenta  spontaneously.   It was intact and had a three-vessel cord.  It was sent to pathology as a specimen.      The perineum was inspected again and there were no additional lacerations.  The cervix was intact.       Infant Wt:    5lbs,  1oz.          Apgars: 1' 9  /  5' 9     Placenta and Cord:          Mechanism: spontaneous        Description:  complete    Estimated Blood Loss:  less than 50 mL                             Complications:  None           Condition: Stable    Male       2023  Steven Pruitt MD

## 2023-06-07 NOTE — ANESTHESIA PREPROCEDURE EVALUATION
Anesthesia Evaluation     NPO Solid Status: > 4 hours  NPO Liquid Status: > 4 hours           Airway   Mallampati: I  No difficulty expected  Dental      Pulmonary    Cardiovascular   Exercise tolerance: good (4-7 METS)        Neuro/Psych  GI/Hepatic/Renal/Endo      Musculoskeletal     Abdominal    Substance History      OB/GYN    (+) Pregnant        Other                        Anesthesia Plan    ASA 2     epidural     (  37w6d  )    Anesthetic plan, risks, benefits, and alternatives have been provided, discussed and informed consent has been obtained with: patient.      CODE STATUS:

## 2023-06-07 NOTE — LACTATION NOTE
P1. Baby Boy 37w6d, Baby is SGA at 5-1.5. he nursed x 30 mins in L&D but is sleepy now. Hand expression provided drops of colostrum. HGP initiated but no colostrum obtained. Baby was fed 10cc neosure . Mom was hoping to exclusively breastfeed but understands why supplementation is needed. Enc her to use HGP q 2-3 hours unless baby nurses for at least 15 minutes.   Lactation Consult Note    Evaluation Completed: 2023 18:45 EDT  Patient Name: Venice Lao  :  2005  MRN:  [id number]     REFERRAL  INFORMATION:                          Date of Referral: 23   Person Making Referral: lactation consultant, nurse  Maternal Reason for Referral: breastfeeding currently, no prior breastfeeding experience  Infant Reason for Referral: sleepy, low birth weight    DELIVERY HISTORY:        Skin to skin initiation date/time: 2023  2:00 PM   Skin to skin end date/time: 2023  3:15 PM        MATERNAL ASSESSMENT:     Breast Shape: Left:, tubular, Right:, round, pendulous (23 1800)  Breast Density: Bilateral:, soft (23 1800)  Areola: Bilateral:, elastic (23 1800)  Nipples: everted (23 1800)     Left Nipple Symptoms: intact (23 1800)  Right Nipple Symptoms: intact (23 1800)       INFANT ASSESSMENT:  Information for the patient's :  Fitz Lao [[id number]]   No past medical history on file.                                                                                                   MATERNAL INFANT FEEDING:     Maternal Emotional State: receptive, relaxed (23 1800)  Infant Positioning: cross-cradle (23 1800)                  Milk Ejection Reflex: present (23 1800)           Latch Assistance: full assistance needed (23 1800)                               EQUIPMENT TYPE:  Breast Pump Type: double electric, hospital grade (23 1800)  Breast Pump Flange Type: hard (23 1800)  Breast Pump Flange Size: 24 mm (23 1800)                         BREAST PUMPING:  Breast Pumping Interventions: post-feed pumping encouraged (06/07/23 1800)       LACTATION REFERRALS:  Lactation Referrals: outpatient lactation program (06/07/23 1800)

## 2023-06-07 NOTE — PLAN OF CARE
Goal Outcome Evaluation:  Plan of Care Reviewed With: patient, significant other        Progress: improving  Outcome Evaluation: vaginal delivery, pain management, breast-feeding

## 2023-06-07 NOTE — ANESTHESIA PROCEDURE NOTES
Labor Epidural      Patient reassessed immediately prior to procedure    Patient location during procedure: OB  Performed By  Anesthesiologist: Johnny Irving MD  Preanesthetic Checklist  Completed: patient identified, IV checked, site marked, risks and benefits discussed, surgical consent, monitors and equipment checked, pre-op evaluation and timeout performed  Prep:  Pt Position:sitting  Sterile Tech:gloves, cap and sterile barrier  Prep:chlorhexidine gluconate and isopropyl alcohol  Monitoring:continuous pulse oximetry, EKG and blood pressure monitoring  Epidural Block Procedure:  Approach:midline  Guidance:landmark technique  Location:L3-L4  Needle Type:Tuohy  Needle Gauge:18 G  Loss of Resistance Medium: air  Loss of Resistance: 6cm  Cath Depth at skin:11 cm  Paresthesia: none  Aspiration:negative  Test Dose:negative  Number of Attempts: 1  Post Assessment:  Dressing:secured with tape and occlusive dressing applied  Pt Tolerance:patient tolerated the procedure well with no apparent complications  Complications:no

## 2023-06-07 NOTE — ANESTHESIA POSTPROCEDURE EVALUATION
"Patient: Venice Lao    Procedure Summary       Date: 06/07/23 Room / Location:     Anesthesia Start: 0540 Anesthesia Stop: 1340    Procedure: LABOR ANALGESIA Diagnosis:     Scheduled Providers:  Provider: Johnny Irving MD    Anesthesia Type: epidural ASA Status: 2            Anesthesia Type: epidural    Vitals  Vitals Value Taken Time   /86 06/07/23 1400   Temp 36.9 °C (98.4 °F) 06/07/23 1100   Pulse 88 06/07/23 1400   Resp 16 06/07/23 1100   SpO2 99 % 06/07/23 1339   Vitals shown include unvalidated device data.        Post Anesthesia Care and Evaluation    Patient location during evaluation: bedside  Patient participation: complete - patient participated  Level of consciousness: sleepy but conscious  Pain score: 0  Pain management: adequate    Airway patency: patent  Anesthetic complications: No anesthetic complications    Cardiovascular status: acceptable  Respiratory status: acceptable  Hydration status: acceptable    Comments: /83   Pulse 101   Temp 36.9 °C (98.4 °F) (Oral)   Resp 16   Ht 157.5 cm (62\")   Wt 57.2 kg (126 lb)   SpO2 97%   Breastfeeding Yes   BMI 23.05 kg/m²       "

## 2023-06-08 LAB
BASOPHILS # BLD AUTO: 0.03 10*3/MM3 (ref 0–0.2)
BASOPHILS NFR BLD AUTO: 0.3 % (ref 0–1.5)
DEPRECATED RDW RBC AUTO: 40.4 FL (ref 37–54)
EOSINOPHIL # BLD AUTO: 0.05 10*3/MM3 (ref 0–0.4)
EOSINOPHIL NFR BLD AUTO: 0.5 % (ref 0.3–6.2)
ERYTHROCYTE [DISTWIDTH] IN BLOOD BY AUTOMATED COUNT: 13 % (ref 12.3–15.4)
HCT VFR BLD AUTO: 28.8 % (ref 34–46.6)
HGB BLD-MCNC: 10.1 G/DL (ref 12–15.9)
IMM GRANULOCYTES # BLD AUTO: 0.07 10*3/MM3 (ref 0–0.05)
IMM GRANULOCYTES NFR BLD AUTO: 0.6 % (ref 0–0.5)
LYMPHOCYTES # BLD AUTO: 2.06 10*3/MM3 (ref 0.7–3.1)
LYMPHOCYTES NFR BLD AUTO: 19 % (ref 19.6–45.3)
MCH RBC QN AUTO: 30.2 PG (ref 26.6–33)
MCHC RBC AUTO-ENTMCNC: 35.1 G/DL (ref 31.5–35.7)
MCV RBC AUTO: 86.2 FL (ref 79–97)
MONOCYTES # BLD AUTO: 1.42 10*3/MM3 (ref 0.1–0.9)
MONOCYTES NFR BLD AUTO: 13.1 % (ref 5–12)
NEUTROPHILS NFR BLD AUTO: 66.5 % (ref 42.7–76)
NEUTROPHILS NFR BLD AUTO: 7.2 10*3/MM3 (ref 1.7–7)
NRBC BLD AUTO-RTO: 0 /100 WBC (ref 0–0.2)
PLATELET # BLD AUTO: 220 10*3/MM3 (ref 140–450)
PMV BLD AUTO: 8.6 FL (ref 6–12)
RBC # BLD AUTO: 3.34 10*6/MM3 (ref 3.77–5.28)
WBC NRBC COR # BLD: 10.83 10*3/MM3 (ref 3.4–10.8)

## 2023-06-08 PROCEDURE — 0503F POSTPARTUM CARE VISIT: CPT | Performed by: OBSTETRICS & GYNECOLOGY

## 2023-06-08 PROCEDURE — 85025 COMPLETE CBC W/AUTO DIFF WBC: CPT | Performed by: OBSTETRICS & GYNECOLOGY

## 2023-06-08 RX ADMIN — DOCUSATE SODIUM 100 MG: 100 CAPSULE, LIQUID FILLED ORAL at 10:33

## 2023-06-08 RX ADMIN — PRENATAL VITAMINS-IRON FUMARATE 27 MG IRON-FOLIC ACID 0.8 MG TABLET 1 TABLET: at 10:33

## 2023-06-08 RX ADMIN — IBUPROFEN 600 MG: 600 TABLET ORAL at 21:31

## 2023-06-08 RX ADMIN — Medication 1 APPLICATION: at 21:32

## 2023-06-08 RX ADMIN — DOCUSATE SODIUM 100 MG: 100 CAPSULE, LIQUID FILLED ORAL at 21:23

## 2023-06-08 NOTE — LACTATION NOTE
This note was copied from a baby's chart.  LC followup. Patient visiting infant in NICU. Denies questions about using pump but is dismayed to get only droplets of colostrum. Discussed when to expect milk to come in and the importance of being consistent with frequent pumping. Patient knows to stay well hydrated. Enc to call LC with questions or concerns.

## 2023-06-08 NOTE — PROGRESS NOTES
"Discharge Planning Assessment  [facility]     Patient Name: Venice Lao  MRN: [id number]  Today's Date: 6/8/2023    Admit Date: 6/6/2023    Plan: Infant may discharge to mother when medically ready; CSW will follow cord. ADELAIDE Louis.   Discharge Needs Assessment    No documentation.                  Discharge Plan       Row Name 06/08/23 1137       Plan    Plan Infant may discharge to mother when medically ready; CSW will follow cord. ADELAIDE Louis.    Plan Comments Mother: Venice Lao, MRN: [id number]; infant: Fitz \"Zi'aire\" Shilo, MRN: [id number]. CSW consulted for \"late prenatal care, cord follow up.\" Of note, mother's UDS was negative on admit. Infant's UDS was missed; cord toxicology sent. CSW met with mother at bedside while father of infant/significant other was in the room. Mother gave consent for father to be present during assessment. Mother verified address, phone number, and insurance. Mother confirmed [employer] has spoken to her about adding infant to health insurance. Mother reports having a car seat, crib/bassinet, clothes, and diapers for infant. This is mother and father's first baby. Mother reports maternal grandma, paternal grandma, father of infant/significant other, and other family members are available for support as needed. Infant's pediatrician is TBD; mother is comfortable scheduling appointments for infant and has transportation. Mother is not current with [facility]. Mother asked how to apply for [facility]. CSW explained the role of the hospital WIC rep and mother agreed to a consult. CSW consulted hospital [place] rep. CSW spoke to mother about the HANDS program and mother agreed to CSW making a referral today. CSW made a referral via HANDS website. CSW also completed the SDOH questions with mother. CSW provided mother with a packet of resources including: WIC, HANDS, transportation, infant supplies, counseling, online support groups, postpartum mood and anxiety resources, NICU " parent resources, and general community resources. CSW spent time building rapport with mother, and offered validation, support, and encouragement to mother throughout assessment. Mother was polite and appropriate, and denied having unmet needs or concerns at this time. CSW will remain available for psychosocial needs while infant is in the NICU. CSW will follow cord toxicology and complete mandated reporting to CPS if warranted. ADELAIDE Louis.                  Continued Care and Services - Admitted Since 6/6/2023    Coordination has not been started for this encounter.          Demographic Summary       Row Name 06/08/23 1136       General Information    Admission Type inpatient    Arrived From home    Referral Source nursing    Reason for Consult other (see comments)    General Information Comments Late prenatal care, cord follow up                   Functional Status       Row Name 06/08/23 1136       Functional Status, IADL    Medications independent    Meal Preparation independent    Housekeeping independent    Laundry independent    Shopping independent       Mental Status    General Appearance WDL WDL       Mental Status Summary    Recent Changes in Mental Status/Cognitive Functioning no changes       Employment/    Employment Status unemployed                   Psychosocial       Row Name 06/08/23 1137       Behavior WDL    Behavior WDL WDL       Emotion Mood WDL    Emotion/Mood/Affect WDL WDL       Speech WDL    Speech WDL WDL       Perceptual State WDL    Perceptual State WDL WDL       Thought Process WDL    Thought Process WDL WDL       Intellectual Performance WDL    Intellectual Performance WDL WDL       Coping/Stress    Major Change/Loss/Stressor birth    Patient Personal Strengths future/goal oriented;motivated;positive attitude;strong support system    Sources of Support parent(s);other family members;significant other                   Abuse/Neglect       Row Name 06/08/23 1133        Personal Safety    Physical Signs of Abuse Present no                   Legal    No documentation.                  Substance Abuse       Row Name 06/08/23 1137       Substance Use    Substance Use Comment Mom UDS neg. No UDS infant; cord tox sent.                   Patient Forms    No documentation.                     SHIRLEY Romero

## 2023-06-08 NOTE — PLAN OF CARE
Goal Outcome Evaluation:      VSS, pain well controlled, ambulating well, resting well, pumping and visits NICU

## 2023-06-08 NOTE — PROGRESS NOTES
Postpartum Progress Note      Status post Vaginal Delivery: Doing well postoperatively.     1) postpartum care immediately following delivery : Doing well, routine care  2) Hypertension, postpartum - very mild/borderline BP elevations with most in normal range. Will monitor over course of today.      Rh status: B positive   Rubella: immune  Gender: male, in NICU for temperature instability (hold circ today).     Subjective     Postpartum Day 1: Vaginal delivery    The patient feels well. The patient denies emotional concerns. Pain is well controlled with current medications. The baby is well. The patient is ambulating well. The patient is tolerating a normal diet.     Objective     Vital signs in last 24 hours:  Temp:  [97.1 °F (36.2 °C)-99.3 °F (37.4 °C)] 98.1 °F (36.7 °C)  Heart Rate:  [] 66  Resp:  [16-18] 16  BP: ()/(47-96) 122/77      General:    alert, appears stated age, and cooperative   Abdomen:  Soft, Non-tender    Lochia:  appropriate   Uterine Fundus:   firm   Ext    Edema 1+   DVT Evaluation:  No evidence of DVT seen on physical exam.     Lab Results   Component Value Date    WBC 10.83 (H) 06/08/2023    HGB 10.1 (L) 06/08/2023    HCT 28.8 (L) 06/08/2023    MCV 86.2 06/08/2023     06/08/2023       Luis Angel Franks MD  6/8/2023  10:57 EDT

## 2023-06-08 NOTE — LACTATION NOTE
"Pt reports baby went to NICU last night due to low temperature. She reports she pumped last night and \"didn't get anything out.\". encouraged pt to pump every 3 hours. Educated on supply and demand. Pt denies questions. Encouraged to call LC as needed.    Lactation Consult Note    Evaluation Completed: 2023 09:18 EDT  Patient Name: Venice Lao  :  2005  MRN:  [id number]     REFERRAL  INFORMATION:                          Date of Referral: 23   Person Making Referral: lactation consultant, nurse  Maternal Reason for Referral: breastfeeding currently, no prior breastfeeding experience  Infant Reason for Referral: sleepy, low birth weight    DELIVERY HISTORY:        Skin to skin initiation date/time: 2023  2:00 PM   Skin to skin end date/time: 2023  3:15 PM        MATERNAL ASSESSMENT:                               INFANT ASSESSMENT:  Information for the patient's :  Fitz Lao [[id number]]   No past medical history on file.                                                                                                   MATERNAL INFANT FEEDING:                                                                       EQUIPMENT TYPE:                                 BREAST PUMPING:          LACTATION REFERRALS:                                           "

## 2023-06-08 NOTE — LACTATION NOTE
This note was copied from a baby's chart.  P2, 37/6, SGA, rewarmed times 3, admitted to NICU this am. Mom is using HGP. Pumping schedule, cleaning, sterilization, collection,labeling and safe storage for NICU infant reviewed.  Mom has lanolin at BS and PBP. Reviewed Postpartum and [place] handbook and [facility] info. Parents aware there is LC coverage today and tonight.

## 2023-06-08 NOTE — PLAN OF CARE
Goal Outcome Evaluation:      VSS.  Pt up ad julia and voiding without difficulty.  Fundal assessment and lochia, wnl.  Pain controlled w/prn ibuprofen.

## 2023-06-09 VITALS
DIASTOLIC BLOOD PRESSURE: 78 MMHG | TEMPERATURE: 97.8 F | OXYGEN SATURATION: 97 % | HEIGHT: 62 IN | HEART RATE: 66 BPM | WEIGHT: 126 LBS | BODY MASS INDEX: 23.19 KG/M2 | RESPIRATION RATE: 16 BRPM | SYSTOLIC BLOOD PRESSURE: 126 MMHG

## 2023-06-09 PROBLEM — Z34.90 PREGNANCY: Status: RESOLVED | Noted: 2023-06-06 | Resolved: 2023-06-09

## 2023-06-09 RX ORDER — FERROUS SULFATE 325(65) MG
325 TABLET ORAL
Qty: 60 TABLET | Refills: 0 | Status: SHIPPED | OUTPATIENT
Start: 2023-06-09

## 2023-06-09 RX ORDER — IBUPROFEN 600 MG/1
600 TABLET ORAL EVERY 6 HOURS PRN
Qty: 40 TABLET | Refills: 0 | Status: SHIPPED | OUTPATIENT
Start: 2023-06-09

## 2023-06-09 RX ORDER — PSEUDOEPHEDRINE HCL 30 MG
100 TABLET ORAL 2 TIMES DAILY
Qty: 60 CAPSULE | Refills: 0 | Status: SHIPPED | OUTPATIENT
Start: 2023-06-09

## 2023-06-09 RX ADMIN — PRENATAL VITAMINS-IRON FUMARATE 27 MG IRON-FOLIC ACID 0.8 MG TABLET 1 TABLET: at 12:30

## 2023-06-09 NOTE — LACTATION NOTE
Pt reports she pumped 2 times in last 24 hours. Educated on supply and demand. Encouraged to pump every 3 hours. Pt denies questions    Lactation Consult Note    Evaluation Completed: 2023 10:54 EDT  Patient Name: Venice Lao  :  2005  MRN:  [id number]     REFERRAL  INFORMATION:                          Date of Referral: 23   Person Making Referral: lactation consultant, nurse  Maternal Reason for Referral: breastfeeding currently, no prior breastfeeding experience  Infant Reason for Referral: sleepy, low birth weight    DELIVERY HISTORY:        Skin to skin initiation date/time: 2023  2:00 PM   Skin to skin end date/time: 2023  3:15 PM        MATERNAL ASSESSMENT:                               INFANT ASSESSMENT:  Information for the patient's :  Fitz Lao [[id number]]   No past medical history on file.                                                                                                   MATERNAL INFANT FEEDING:                                                                       EQUIPMENT TYPE:                                 BREAST PUMPING:          LACTATION REFERRALS:

## 2023-06-09 NOTE — DISCHARGE INSTRUCTIONS
Discharge instructions reviewed including but not limited to: Bleeding precautions (soaking a pad in an hour for 2 consecutive hours or passing a tennis ball size clot or greater, please call the office); Preeclampsia precautions (return with headache, visual changes, right upper quadrant pain, concern for elevated blood pressures, other concerning symptoms), infection precautions (redness around incision, purulent drainage, temp of 100.4 or greater), return with any chest pain, shortness of breath at rest, unilateral leg swelling or calf pain, or other concerning signs or symptoms.  Patient was instructed to monitor mood and that if she feels symptoms of depression she should seek medical attention.  If thoughts of harming self or others arise, she should be seen immediately by a medical professional.

## 2023-06-09 NOTE — PLAN OF CARE
Goal Outcome Evaluation:                 Vital signs stable. Pain is controlled with po medication. Up ad julia, ambulates to NICU to visit infant frequently. Fundus firm, lochia light. Voiding without difficulty, tolerating regular diet.

## 2023-06-09 NOTE — LACTATION NOTE
This note was copied from a baby's chart.  Parents just returned from NICU. Mom said everything is going fine but is going to be giving formula also. Encouraged her to continue ot pump every 3 hours for her supply and to call LC is she had any other needs.

## 2023-06-09 NOTE — PROGRESS NOTES
Postpartum Progress Note      Status post Vaginal Delivery: Doing well postoperatively.     1) postpartum care immediately following delivery :   - Patient is meeting postpartum milestones and ready for discharge.  - Discharge orders in.  - Reviewed discharge instructions and precautions.  - Patient desires to board while infant is in the NICU.     2) Postpartum hypertension  - She has remained normotensive over last 24 hours.  - She had a few borderline/mild range blood pressures on PPD#1 that were in a span of less than hours.   - I do not feel that she rules in for hypertension related to pregnancy at this time but have reviewed preeclampsia precautions with the patient.     3) Postpartum anemia, delivered   - Hgb 11.7-->10.1   - Remains asymptomatic  - Discussed precautions and sent home on PO iron supplementation.       Rh status: B positive   Rubella: Immune  Gender: male infant, in NICU for temperature instability (hold circ)     Subjective     Postpartum Day 2: Vaginal delivery    The patient feels well. The patient denies emotional concerns. Pain is well controlled with current medications. The baby is well in the NICU. The patient is ambulating well. The patient is tolerating a normal diet. She reports off and on vaginal bleeding. She is voiding without difficulty. She is passing flatus.     Objective     Vital signs in last 24 hours:  Temp:  [97.7 °F (36.5 °C)-98.1 °F (36.7 °C)] 97.8 °F (36.6 °C)  Heart Rate:  [64-71] 66  Resp:  [16] 16  BP: (119-126)/(72-82) 126/78      General:    alert, appears stated age, and cooperative   Lungs:  No increased work of breathing, regular respirations    Abdomen:  Soft, Non-tender    Lochia:  appropriate   Uterine Fundus:   firm   Ext    No lower extremity edema    DVT Evaluation:  No evidence of DVT seen on physical exam.     Lab Results   Component Value Date    WBC 10.83 (H) 06/08/2023    HGB 10.1 (L) 06/08/2023    HCT 28.8 (L) 06/08/2023    MCV 86.2 06/08/2023      06/08/2023       Anna Briseno MD  6/9/2023  09:20 EDT

## 2023-06-09 NOTE — PLAN OF CARE
Goal Outcome Evaluation:    Ready for dc, pain well controlled, bleeding minimal

## 2023-06-12 NOTE — PROGRESS NOTES
"Continued Stay Note  [facility]     Patient Name: Venice Lao  MRN: [id number]  Today's Date: 6/12/2023    Admit Date: 6/6/2023    Plan: Infant may discharge to mother when medically ready; CSW will follow cord. ADELAIDE Louis.   Discharge Plan       Row Name 06/12/23 1434       Plan    Plan Comments Mother: Venice Lao, MRN: [id number]; infant: Fitz \"Zi'aire\" Shilo, MRN: [id number]. CSW has reviewed infant's cord toxicology results and infant's cord was negative for substances. Mandated CPS reporting is not required at this time. ADELAIDE Louis.                   Discharge Codes    No documentation.                 Expected Discharge Date and Time       Expected Discharge Date Expected Discharge Time    Jun 9, 2023               SHIRLEY Romero    "

## 2023-08-15 ENCOUNTER — CLINICAL SUPPORT (OUTPATIENT)
Dept: OBSTETRICS AND GYNECOLOGY | Facility: CLINIC | Age: 18
End: 2023-08-15
Payer: COMMERCIAL

## 2023-08-15 ENCOUNTER — TELEPHONE (OUTPATIENT)
Dept: OBSTETRICS AND GYNECOLOGY | Facility: CLINIC | Age: 18
End: 2023-08-15

## 2023-08-15 VITALS
HEIGHT: 62 IN | DIASTOLIC BLOOD PRESSURE: 52 MMHG | WEIGHT: 106 LBS | BODY MASS INDEX: 19.51 KG/M2 | SYSTOLIC BLOOD PRESSURE: 136 MMHG

## 2023-08-15 DIAGNOSIS — Z32.01 POSITIVE URINE PREGNANCY TEST: ICD-10-CM

## 2023-08-15 DIAGNOSIS — Z30.013 INITIATION OF DEPO PROVERA: Primary | ICD-10-CM

## 2023-08-15 LAB
B-HCG UR QL: POSITIVE
EXPIRATION DATE: ABNORMAL
INTERNAL NEGATIVE CONTROL: NEGATIVE
INTERNAL POSITIVE CONTROL: POSITIVE
Lab: ABNORMAL

## 2023-08-15 NOTE — PROGRESS NOTES
Pt here for initiation of Depo injection, stated no unprotected intercourse in last 2wks and currently on cycle. Urine pregnancy test done in office today was positive. Spoke with Dr. Pruitt advised getting quant done today. Depo medication was drawn up and had to be discarded.

## 2023-08-15 NOTE — TELEPHONE ENCOUNTER
Pt left without getting blood drawn. Left message to return call. Needs to get this done due to pos preg test in office. SM

## 2023-10-30 ENCOUNTER — TELEPHONE (OUTPATIENT)
Dept: OBSTETRICS AND GYNECOLOGY | Facility: CLINIC | Age: 18
End: 2023-10-30
Payer: COMMERCIAL

## 2024-02-28 ENCOUNTER — TELEPHONE (OUTPATIENT)
Dept: OBSTETRICS AND GYNECOLOGY | Facility: CLINIC | Age: 19
End: 2024-02-28
Payer: COMMERCIAL

## 2024-02-28 NOTE — TELEPHONE ENCOUNTER
Caller: Venice Lao    Relationship to patient: Self    Best call back number: [phone]     Chief complaint: ODOR-DECLINED ITCHING, BURNING OR DISCHARGE. CONCERNED PH BALANCE IS OFF.     Type of visit: GYN FOLLOW UP    Requested date:  NA    If rescheduling, when is the original appointment: LATE CANCEL-02/28/24 AT 3:00 PM WITH ELIDA DUNCAN    Additional notes: DUE TO LACK OF TRANSPORTATION, PATIENT NEEDED TO RESCHEDULE TODAY'S APPT. HUB RESCHEDULED FOR NEXT AVAILABLE 03/05/24.

## 2024-03-12 ENCOUNTER — TELEPHONE (OUTPATIENT)
Dept: OBSTETRICS AND GYNECOLOGY | Facility: CLINIC | Age: 19
End: 2024-03-12
Payer: COMMERCIAL

## 2024-10-16 ENCOUNTER — OFFICE VISIT (OUTPATIENT)
Dept: OBSTETRICS AND GYNECOLOGY | Facility: CLINIC | Age: 19
End: 2024-10-16
Payer: COMMERCIAL

## 2024-10-16 VITALS
BODY MASS INDEX: 16.2 KG/M2 | HEIGHT: 62 IN | SYSTOLIC BLOOD PRESSURE: 113 MMHG | WEIGHT: 88 LBS | DIASTOLIC BLOOD PRESSURE: 72 MMHG

## 2024-10-16 DIAGNOSIS — Z23 NEED FOR HPV VACCINATION: ICD-10-CM

## 2024-10-16 DIAGNOSIS — Z11.3 SCREENING FOR STD (SEXUALLY TRANSMITTED DISEASE): ICD-10-CM

## 2024-10-16 DIAGNOSIS — Z01.419 WOMEN'S ANNUAL ROUTINE GYNECOLOGICAL EXAMINATION: Primary | ICD-10-CM

## 2024-10-16 DIAGNOSIS — Z30.8 ENCOUNTER FOR OTHER CONTRACEPTIVE MANAGEMENT: ICD-10-CM

## 2024-10-16 RX ORDER — ESCITALOPRAM OXALATE 10 MG/1
1 TABLET ORAL DAILY
COMMUNITY
Start: 2024-08-22

## 2024-10-16 RX ORDER — NORGESTIMATE AND ETHINYL ESTRADIOL 0.25-0.035
1 KIT ORAL DAILY
Qty: 84 TABLET | Refills: 3 | Status: SHIPPED | OUTPATIENT
Start: 2024-10-16

## 2024-10-16 NOTE — PROGRESS NOTES
GYN Annual Exam     Chief Complaint   Patient presents with    Follow-up     Pt here today to for AE, discuss birth control      HPI    Venice Lao is a 19 y.o. female who presents for annual well woman exam.  She is sexually active. Periods are regular every 28-30 days, lasting 9 days. Dysmenorrhea:none. No intermenstrual bleeding, spotting, or discharge. Performing SBE:occas. She is a patient of Dr. Mora. She would like to discuss birth control options today.  Denies history of migraine with aura, denies history of DVT, there is no family history of DVT. She is a nonsmoker.     OB History          2    Para   1    Term   1            AB   1    Living   1         SAB        IAB        Ectopic        Molar        Multiple   0    Live Births   1              LMP- 10/6/24  Current contraception: none  Last Pap- never  History of STD-denies  Family history of uterine, colon or ovarian cancer: no  Family history of breast cancer: no  Gardasil Vaccine: unsure    History reviewed. No pertinent past medical history.    History reviewed. No pertinent surgical history.      Current Outpatient Medications:     escitalopram (LEXAPRO) 10 MG tablet, Take 1 tablet by mouth Daily., Disp: , Rfl:     norgestimate-ethinyl estradiol (Sprintec 28) 0.25-35 MG-MCG per tablet, Take 1 tablet by mouth Daily., Disp: 84 tablet, Rfl: 3    No Known Allergies    Social History     Tobacco Use    Smoking status: Never     Passive exposure: Never    Smokeless tobacco: Never   Vaping Use    Vaping status: Never Used   Substance Use Topics    Alcohol use: Never    Drug use: Never       History reviewed. No pertinent family history.    Review of Systems   Constitutional:  Negative for chills, fatigue and fever.   Gastrointestinal:  Negative for abdominal distention, abdominal pain, nausea and vomiting.   Genitourinary:  Negative for breast discharge, breast lump, breast pain, dysuria, frequency, menstrual problem, pelvic pain,  "pelvic pressure, urgency, vaginal bleeding, vaginal discharge and vaginal pain.   Musculoskeletal:  Negative for gait problem.   Skin:  Negative for rash.   Neurological:  Negative for dizziness and headache.   Psychiatric/Behavioral:  Negative for behavioral problems.        /72   Ht 157.5 cm (62.01\")   Wt 39.9 kg (88 lb)   LMP 10/06/2024   BMI 16.09 kg/m²     Physical Exam  Constitutional:       General: She is not in acute distress.     Appearance: Normal appearance. She is not ill-appearing, toxic-appearing or diaphoretic.   Genitourinary:      Vulva, bladder and urethral meatus normal.      No lesions in the vagina.      Right Labia: No rash, tenderness, lesions, skin changes or Bartholin's cyst.     Left Labia: No tenderness, lesions, skin changes, Bartholin's cyst or rash.     No labial fusion noted.      No inguinal adenopathy present in the right or left side.     No vaginal discharge, erythema, tenderness, bleeding or ulceration.      No vaginal prolapse present.     No vaginal atrophy present.       Right Adnexa: not tender, not full, not palpable, no mass present and not absent.     Left Adnexa: not tender, not full, not palpable, no mass present and not absent.     No cervical motion tenderness, discharge, friability, lesion, polyp, nabothian cyst or eversion.      Uterus is not enlarged, fixed, tender, irregular or prolapsed.      No uterine mass detected.     No urethral tenderness or mass present.      Pelvic exam was performed with patient in the lithotomy position.   Breasts:     Breasts are symmetrical.      Right: Present. No swelling, bleeding, inverted nipple, mass, nipple discharge, skin change, tenderness or breast implant.      Left: Present. No swelling, bleeding, inverted nipple, mass, nipple discharge, skin change, tenderness or breast implant.   HENT:      Head: Normocephalic and atraumatic.   Eyes:      Pupils: Pupils are equal, round, and reactive to light.   Cardiovascular: "      Rate and Rhythm: Normal rate.   Pulmonary:      Effort: Pulmonary effort is normal.   Abdominal:      General: There is no distension.      Palpations: Abdomen is soft. There is no mass.      Tenderness: There is no abdominal tenderness. There is no guarding.      Hernia: No hernia is present. There is no hernia in the left inguinal area or right inguinal area.   Musculoskeletal:         General: Normal range of motion.      Cervical back: Normal range of motion and neck supple. No tenderness.   Lymphadenopathy:      Cervical: No cervical adenopathy.      Upper Body:      Right upper body: No supraclavicular, axillary or pectoral adenopathy.      Left upper body: No supraclavicular, axillary or pectoral adenopathy.      Lower Body: No right inguinal adenopathy. No left inguinal adenopathy.   Neurological:      General: No focal deficit present.      Mental Status: She is alert and oriented to person, place, and time.      Cranial Nerves: No cranial nerve deficit.   Skin:     General: Skin is warm and dry.   Psychiatric:         Mood and Affect: Mood normal.         Behavior: Behavior normal.         Thought Content: Thought content normal.         Judgment: Judgment normal.   Vitals and nursing note reviewed.       Assessment   Diagnoses and all orders for this visit:    1. Women's annual routine gynecological examination (Primary)  -     IGP,CtNgTv,rfx Apt HPV All    2. Encounter for other contraceptive management  -     norgestimate-ethinyl estradiol (Sprintec 28) 0.25-35 MG-MCG per tablet; Take 1 tablet by mouth Daily.  Dispense: 84 tablet; Refill: 3    3. Screening for STD (sexually transmitted disease)  -     IGP,CtNgTv,rfx Apt HPV All  -     HIV-1 / O / 2 Ag / Antibody  -     RPR, Rfx Qn RPR / Confirm TP  -     Hepatitis C Antibody  -     Hepatitis B Surface Antigen    4. Need for HPV vaccination       Plan   Well woman exam: Pap smear collected. Recommend MVI daily.    Contraception: Discussed  contraception options at length including pills, patch, vaginal ring, POPs,  injection, implant, and IUDs.  The risks and benefits of the methods were discussed including but not limited to the increased risk of heart attack, blood clot, and stroke.  It was discussed the contraception does not protect against sexually transmitted infections and condoms are encouraged. The patient desires to start DHAVAL. Discussed start up, uses, side effects, risks vs benefits. Encouraged condoms for the first 3 weeks of use as back up.    STD: Enc condoms. Desires STD screen today- Yes. Serum and Pap   Smoking status: nonsmoker   Encouraged annual mammogram screening starting at age 40. Instructed on how to perform SBE. Encouraged breast health self awareness.  6.    Encouraged 150 minutes of exercise per week if not medially contraindicated.   7.    BMI 16.09 underweight   8.   Gardasil vaccine: We discussed that this is a vaccine to protect against the human papilloma virus. HPV can cause cervical cancer, as well as genital warts. The vaccine reduces the chance of cervical cancer by up to 70 percent. It also can protect against cancers of the vulva, vagina, anus and possibly laryngeal cancers. The vaccine is recommended for girls and boys the recommended age is 11-12, with catch up vaccination for anyone over that age until the age of 27. There are 3 vaccines in the series.     Return in about 1 year (around 10/16/2025) for Annual physical, With Dr Pruitt.    Adela Malave, APRN  10/16/2024  09:45 EDT

## 2024-10-22 LAB
C TRACH RRNA CVX QL NAA+PROBE: NEGATIVE
CONV .: NORMAL
CYTOLOGIST CVX/VAG CYTO: NORMAL
CYTOLOGY CVX/VAG DOC CYTO: NORMAL
CYTOLOGY CVX/VAG DOC THIN PREP: NORMAL
DX ICD CODE: NORMAL
Lab: NORMAL
N GONORRHOEA RRNA CVX QL NAA+PROBE: NEGATIVE
OTHER STN SPEC: NORMAL
STAT OF ADQ CVX/VAG CYTO-IMP: NORMAL
T VAGINALIS RRNA SPEC QL NAA+PROBE: NEGATIVE

## 2024-10-23 ENCOUNTER — TELEPHONE (OUTPATIENT)
Dept: OBSTETRICS AND GYNECOLOGY | Facility: CLINIC | Age: 19
End: 2024-10-23
Payer: COMMERCIAL

## 2024-10-23 NOTE — TELEPHONE ENCOUNTER
Lm to return call or look at mychart. Also looks like pt did not get labs drawn, is she still wanting these done? SM

## 2024-10-23 NOTE — TELEPHONE ENCOUNTER
----- Message from Adela Ananda sent at 10/23/2024  8:31 AM EDT -----  Pt is not using MyChart. Please let her know that her pap smear was normal. STD testing was negative for chlamydia, gonorrhea, and trichomonas.  Thank you

## 2024-11-12 ENCOUNTER — TELEPHONE (OUTPATIENT)
Dept: OBSTETRICS AND GYNECOLOGY | Facility: CLINIC | Age: 19
End: 2024-11-12
Payer: COMMERCIAL

## 2024-11-12 RX ORDER — MEDROXYPROGESTERONE ACETATE 150 MG/ML
150 INJECTION, SUSPENSION INTRAMUSCULAR
Qty: 1 EACH | Refills: 3 | Status: SHIPPED | OUTPATIENT
Start: 2024-11-12

## 2024-11-12 NOTE — TELEPHONE ENCOUNTER
AE 10/11/24. Did not get labs drawn, just FYI. Wants to switch from oral birth control to Depo. [facility]. Thank you

## 2024-11-12 NOTE — TELEPHONE ENCOUNTER
I have sent Depo provera to her pharmacy. She should start with next period. She will need condoms as back up for first 3 weeks of use to prevent pregnancy. -Adela

## 2024-11-12 NOTE — TELEPHONE ENCOUNTER
Spoke with Venice to let her know that ALAN Galvan sent in Depo Rx to [facility]. You should keep it in the refrigerator. You will need to start with your next period. Call the office to make an injection appt. Use condoms as a back up for first 3 weeks of use to prevent pregnancy.

## 2024-11-12 NOTE — TELEPHONE ENCOUNTER
MADELYN ALCANTARA    [phone]    PT WAS SEEN 10/16/24 FOR AE     PT WANTING TO SWITCH BIRTHCONTROL FROM PILLS TO DEPO     PHARMACY: [facility]

## 2024-11-14 ENCOUNTER — CLINICAL SUPPORT (OUTPATIENT)
Dept: OBSTETRICS AND GYNECOLOGY | Facility: CLINIC | Age: 19
End: 2024-11-14
Payer: COMMERCIAL

## 2024-11-14 DIAGNOSIS — Z30.09 BIRTH CONTROL COUNSELING: Primary | ICD-10-CM

## 2024-11-14 RX ORDER — MEDROXYPROGESTERONE ACETATE 150 MG/ML
150 INJECTION, SUSPENSION INTRAMUSCULAR ONCE
Status: COMPLETED | OUTPATIENT
Start: 2024-11-14 | End: 2024-11-14

## 2024-11-14 RX ADMIN — MEDROXYPROGESTERONE ACETATE 150 MG: 150 INJECTION, SUSPENSION INTRAMUSCULAR at 11:27

## 2025-04-16 ENCOUNTER — CLINICAL SUPPORT (OUTPATIENT)
Dept: OBSTETRICS AND GYNECOLOGY | Facility: CLINIC | Age: 20
End: 2025-04-16
Payer: COMMERCIAL

## 2025-04-16 VITALS — WEIGHT: 93 LBS | HEIGHT: 62 IN | BODY MASS INDEX: 17.11 KG/M2

## 2025-04-16 DIAGNOSIS — Z30.42 ENCOUNTER FOR MANAGEMENT AND INJECTION OF DEPO-PROVERA: Primary | ICD-10-CM

## 2025-04-16 LAB
B-HCG UR QL: NEGATIVE
EXPIRATION DATE: NORMAL
INTERNAL NEGATIVE CONTROL: NEGATIVE
INTERNAL POSITIVE CONTROL: POSITIVE
Lab: NORMAL

## 2025-04-16 RX ORDER — MEDROXYPROGESTERONE ACETATE 150 MG/ML
150 INJECTION, SUSPENSION INTRAMUSCULAR ONCE
Status: COMPLETED | OUTPATIENT
Start: 2025-04-16 | End: 2025-04-16

## 2025-04-16 RX ADMIN — MEDROXYPROGESTERONE ACETATE 150 MG: 150 INJECTION, SUSPENSION INTRAMUSCULAR at 10:06

## 2025-04-16 NOTE — PROGRESS NOTES
Pt here today to restart depo injection, tolerated without a reaction. Rt D  No intercourse in 2 wks, urine hcg neg  NDC:14391-592-36  LOT:[id number]  EXP:04/2027

## 2025-07-14 ENCOUNTER — CLINICAL SUPPORT (OUTPATIENT)
Dept: OBSTETRICS AND GYNECOLOGY | Facility: CLINIC | Age: 20
End: 2025-07-14
Payer: COMMERCIAL

## 2025-07-14 VITALS
DIASTOLIC BLOOD PRESSURE: 72 MMHG | BODY MASS INDEX: 17.52 KG/M2 | WEIGHT: 95.2 LBS | HEIGHT: 62 IN | SYSTOLIC BLOOD PRESSURE: 116 MMHG | HEART RATE: 60 BPM

## 2025-07-14 DIAGNOSIS — Z30.42 ENCOUNTER FOR MANAGEMENT AND INJECTION OF DEPO-PROVERA: Primary | ICD-10-CM

## 2025-07-14 RX ORDER — MEDROXYPROGESTERONE ACETATE 150 MG/ML
150 INJECTION, SUSPENSION INTRAMUSCULAR ONCE
Status: COMPLETED | OUTPATIENT
Start: 2025-07-14 | End: 2025-07-14

## 2025-07-14 RX ADMIN — MEDROXYPROGESTERONE ACETATE 150 MG: 150 INJECTION, SUSPENSION INTRAMUSCULAR at 11:05

## 2025-07-14 NOTE — PROGRESS NOTES
Pt here for Depo-provera, 150 mg, pt supplied.    NDC: 03700-195-90  Lot #: [id number]  Exp: 12/2026    Injection was given in the left deltoid. Pt tolerated well with no reaction.